# Patient Record
Sex: FEMALE | Race: WHITE | NOT HISPANIC OR LATINO | Employment: UNEMPLOYED | URBAN - METROPOLITAN AREA
[De-identification: names, ages, dates, MRNs, and addresses within clinical notes are randomized per-mention and may not be internally consistent; named-entity substitution may affect disease eponyms.]

---

## 2019-06-06 ENCOUNTER — TELEPHONE (OUTPATIENT)
Dept: FAMILY MEDICINE | Facility: CLINIC | Age: 44
End: 2019-06-06

## 2019-06-06 NOTE — TELEPHONE ENCOUNTER
----- Message from Noemi Quinn sent at 6/6/2019 12:24 PM CDT -----  Contact: pt  Name of Who is Calling: Tiffany      What is the request in detail: requesting a call back to schedule to be sen by Dr Quinn. Im not sure if he is not seeing new patients. But I was not getting anything available to schedule for him. Please call [phone] to [employer] authorization dept to verify the pts insurance as its still stating not eligible      Can the clinic reply by [facility]: no      What Number to Call Back if not in [facility]: [phone]

## 2019-06-07 ENCOUNTER — OFFICE VISIT (OUTPATIENT)
Dept: FAMILY MEDICINE | Facility: CLINIC | Age: 44
End: 2019-06-07
Payer: COMMERCIAL

## 2019-06-07 VITALS
WEIGHT: 131.06 LBS | OXYGEN SATURATION: 99 % | SYSTOLIC BLOOD PRESSURE: 122 MMHG | BODY MASS INDEX: 21.06 KG/M2 | HEIGHT: 66 IN | TEMPERATURE: 99 F | HEART RATE: 70 BPM | DIASTOLIC BLOOD PRESSURE: 82 MMHG

## 2019-06-07 DIAGNOSIS — L60.9 NAIL ABNORMALITIES: ICD-10-CM

## 2019-06-07 DIAGNOSIS — L40.9 PSORIASIS: ICD-10-CM

## 2019-06-07 DIAGNOSIS — J02.9 SORE THROAT: Primary | ICD-10-CM

## 2019-06-07 PROCEDURE — 99204 OFFICE O/P NEW MOD 45 MIN: CPT | Mod: S$GLB,,, | Performed by: NURSE PRACTITIONER

## 2019-06-07 PROCEDURE — 99999 PR PBB SHADOW E&M-EST. PATIENT-LVL IV: CPT | Mod: PBBFAC,,, | Performed by: NURSE PRACTITIONER

## 2019-06-07 PROCEDURE — 3008F BODY MASS INDEX DOCD: CPT | Mod: CPTII,S$GLB,, | Performed by: NURSE PRACTITIONER

## 2019-06-07 PROCEDURE — 3008F PR BODY MASS INDEX (BMI) DOCUMENTED: ICD-10-PCS | Mod: CPTII,S$GLB,, | Performed by: NURSE PRACTITIONER

## 2019-06-07 PROCEDURE — 99999 PR PBB SHADOW E&M-EST. PATIENT-LVL IV: ICD-10-PCS | Mod: PBBFAC,,, | Performed by: NURSE PRACTITIONER

## 2019-06-07 PROCEDURE — 99204 PR OFFICE/OUTPT VISIT, NEW, LEVL IV, 45-59 MIN: ICD-10-PCS | Mod: S$GLB,,, | Performed by: NURSE PRACTITIONER

## 2019-06-07 NOTE — PROGRESS NOTES
This dictation has been generated using Modal Fluency Dictation some phonetic errors may occur. Please contact author for clarification if needed.     Problem List Items Addressed This Visit     None      Visit Diagnoses     Sore throat    -  Primary    Psoriasis        Relevant Orders    Ambulatory referral to ENT    Nail abnormalities        right great toe          Orders Placed This Encounter    Ambulatory referral to ENT       Sore throat I suspect she had a tonsillar stone.  Exam is fairly unremarkable with injected appearance that visit.  Psoriasis in the left ear.  Refer to ENT for evaluation and additional therapies.  Would something compounded replace the steroids  Nail abnormalities.  Thyroid was normal in March.  Recommended Lamisil topically for a few months.  May consider podiatry referral.    Follow up if symptoms worsen or fail to improve.    ________________________________________________________________  ________________________________________________________________      Chief Complaint   Patient presents with    Sore Throat     History of present illness  This 43 y.o. presents today for complaint of sore throat, rash, nail abnormality.  Patient is new to me.  Follows with in the clinic.  Complains of a sore throat.  She did see a cyst-like formation on the left tonsil within the past couple of days but it has disappeared.  She did have some discomfort and irritation on the left side of her throat.  Denies any cold symptoms.  Denies any earache.  No gland swelling her fever or chills.  Again symptoms resolved without treatment but she kept the appointment for evaluation.  While she is here she was a talk about her psoriasis.  She notes a history of psoriasis and steroid use.  She is not really comfortable with the steroid use.  She has had the problem on the left hand and in the left ear canal.  It was very mild but she does note itching and irritation in the left ear.  It can be quite  irritated at times.  Patient notes right great toe nail abnormality since October.  She had appeared a cure at that time.  She notes that the distal part of the nail is white.  It started after the pedicure.  She denies any med use.  She does have a history of hypothyroidism however notes normal labs in March.    Complete review of systems otherwise negative    Past medical and social history reviewed.  Patient new to me.  Follows with in the clinic.    Past Medical History:   Diagnosis Date    Hypothyroidism     Vitamin D deficiency disease        Past Surgical History:   Procedure Laterality Date     SECTION      x 1    FOOT SURGERY      right       Family History   Problem Relation Age of Onset    Thyroid disease Mother     Cancer Mother         skin    Hypertension Father     Diabetes Father     Stroke Father     Cancer Sister         skin    Cancer Sister         skin       Social History     Socioeconomic History    Marital status:      Spouse name: Not on file    Number of children: Not on file    Years of education: Not on file    Highest education level: Not on file   Occupational History    Not on file   Social Needs    Financial resource strain: Not on file    Food insecurity:     Worry: Not on file     Inability: Not on file    Transportation needs:     Medical: Not on file     Non-medical: Not on file   Tobacco Use    Smoking status: Never Smoker    Smokeless tobacco: Never Used   Substance and Sexual Activity    Alcohol use: No    Drug use: No    Sexual activity: Yes     Partners: Male   Lifestyle    Physical activity:     Days per week: Not on file     Minutes per session: Not on file    Stress: Not on file   Relationships    Social connections:     Talks on phone: Not on file     Gets together: Not on file     Attends Taoism service: Not on file     Active member of club or organization: Not on file     Attends meetings of clubs or organizations: Not on  file     Relationship status: Not on file   Other Topics Concern    Not on file   Social History Narrative    Not on file       Current Outpatient Medications   Medication Sig Dispense Refill    CHOLECALCIFEROL, VITAMIN D3, (VITAMIN D3 ORAL) Take by mouth.      multivitamin (MULTIVITAMIN) per tablet Take 1 tablet by mouth once daily.      SYNTHROID 50 mcg tablet TAKE ONE TABLET BY MOUTH ONCE DAILY 30 tablet 0     No current facility-administered medications for this visit.        Review of patient's allergies indicates:  No Known Allergies    Physical examination  Vitals Reviewed  Gen. Well-dressed well-nourished   Skin warm dry and intact.  No rashes noted.  HEENT.  TM intact bilateral with normal light reflex.  No mastoid tenderness during percussion.  Nares patent bilateral.  Pharynx is unremarkable except injected appearance.  No maxillary or frontal sinus tenderness when percussed.  Slight rash noted to the 1st 1/3 of the auditory canal.  No skin flaking noted.  Neck is supple without adenopathy  Chest.  Respirations are even unlabored.  Lungs are clear to auscultation.  Cardiac regular rate and rhythm.  No chest wall adenopathy noted.  Neuro. Awake alert oriented x4.  Normal judgment and cognition noted.  Extremities no clubbing cyanosis or edema noted.     Call or return to clinic prn if these symptoms worsen or fail to improve as anticipated.

## 2019-09-25 ENCOUNTER — OFFICE VISIT (OUTPATIENT)
Dept: FAMILY MEDICINE | Facility: CLINIC | Age: 44
End: 2019-09-25
Payer: COMMERCIAL

## 2019-09-25 VITALS
OXYGEN SATURATION: 99 % | SYSTOLIC BLOOD PRESSURE: 112 MMHG | HEIGHT: 66 IN | DIASTOLIC BLOOD PRESSURE: 82 MMHG | HEART RATE: 102 BPM | TEMPERATURE: 99 F | WEIGHT: 129 LBS | BODY MASS INDEX: 20.73 KG/M2

## 2019-09-25 DIAGNOSIS — B96.89 ACUTE BACTERIAL BRONCHITIS: Primary | ICD-10-CM

## 2019-09-25 DIAGNOSIS — J02.8 ACUTE BACTERIAL PHARYNGITIS: ICD-10-CM

## 2019-09-25 DIAGNOSIS — B96.89 ACUTE BACTERIAL PHARYNGITIS: ICD-10-CM

## 2019-09-25 DIAGNOSIS — J20.8 ACUTE BACTERIAL BRONCHITIS: Primary | ICD-10-CM

## 2019-09-25 DIAGNOSIS — R13.10 SWALLOWING PAINFUL: ICD-10-CM

## 2019-09-25 DIAGNOSIS — B96.89 ACUTE BACTERIAL SINUSITIS: ICD-10-CM

## 2019-09-25 DIAGNOSIS — J01.90 ACUTE BACTERIAL SINUSITIS: ICD-10-CM

## 2019-09-25 LAB
CTP QC/QA: YES
S PYO RRNA THROAT QL PROBE: NEGATIVE

## 2019-09-25 PROCEDURE — 87880 POCT RAPID STREP A: ICD-10-PCS | Mod: QW,S$GLB,, | Performed by: INTERNAL MEDICINE

## 2019-09-25 PROCEDURE — 87880 STREP A ASSAY W/OPTIC: CPT | Mod: QW,S$GLB,, | Performed by: INTERNAL MEDICINE

## 2019-09-25 PROCEDURE — 99213 OFFICE O/P EST LOW 20 MIN: CPT | Mod: S$GLB,,, | Performed by: INTERNAL MEDICINE

## 2019-09-25 PROCEDURE — 99213 PR OFFICE/OUTPT VISIT, EST, LEVL III, 20-29 MIN: ICD-10-PCS | Mod: S$GLB,,, | Performed by: INTERNAL MEDICINE

## 2019-09-25 PROCEDURE — 3008F PR BODY MASS INDEX (BMI) DOCUMENTED: ICD-10-PCS | Mod: CPTII,S$GLB,, | Performed by: INTERNAL MEDICINE

## 2019-09-25 PROCEDURE — 99999 PR PBB SHADOW E&M-EST. PATIENT-LVL III: ICD-10-PCS | Mod: PBBFAC,,, | Performed by: INTERNAL MEDICINE

## 2019-09-25 PROCEDURE — 99999 PR PBB SHADOW E&M-EST. PATIENT-LVL III: CPT | Mod: PBBFAC,,, | Performed by: INTERNAL MEDICINE

## 2019-09-25 PROCEDURE — 3008F BODY MASS INDEX DOCD: CPT | Mod: CPTII,S$GLB,, | Performed by: INTERNAL MEDICINE

## 2019-09-25 RX ORDER — FLUTICASONE PROPIONATE 50 MCG
SPRAY, SUSPENSION (ML) NASAL
Qty: 16 G | Refills: 1 | Status: SHIPPED | OUTPATIENT
Start: 2019-09-25 | End: 2021-06-15 | Stop reason: SDUPTHER

## 2019-09-25 RX ORDER — AMOXICILLIN AND CLAVULANATE POTASSIUM 875; 125 MG/1; MG/1
1 TABLET, FILM COATED ORAL EVERY 12 HOURS
Qty: 20 TABLET | Refills: 0 | Status: SHIPPED | OUTPATIENT
Start: 2019-09-25 | End: 2019-10-05

## 2019-09-25 NOTE — PROGRESS NOTES
Subjective:       Patient ID: Tiffany Hurst is a 44 y.o. female.    Chief Complaint: Cough (dry hacking); Sinus Problem (head pressure); Nasal Congestion; and Sore Throat (sometime difficult to swallow)    HPI  Seeing patient today for Dr. Quinn her PCP.  Patient here today for sinus and cough issues.  She has had a history of a hacking cough for 11 days; now with productive sputum last 4-5 days; green to greenish yellow in coloration.  She has had head pressure involving both her ears and around the back of her head; for the last 2-3 days; postnasal drip through most of the last 11 days.  Nasal congestion slight the last 2 days w no runny nose.  Sore throat w sometime difficult to swallow for 11 days; dry throat noted. She recently saw Paul Croft NP 06/07/2019 for sore throat and suspected passing of tonsil stone then; he did not view the tonsil stone however on exam; she was referred to ENT at that time but has yet to have a chance to see an ENT.  She has also had episodes w occasional mucus upper chest at night but no belching or heartburn. Has taken cough drops, honey, and some flonase.     Two months ago she had 4 family members that had sinus issues with 2 needing antibiotic for cough/sinus and ear infection.    Review of Systems   Constitutional: Negative for chills, fever and unexpected weight change.   HENT: Positive for postnasal drip, sore throat and trouble swallowing. Negative for congestion and rhinorrhea.    Eyes: Negative for discharge and itching.   Respiratory: Negative for cough, chest tightness, shortness of breath and wheezing.    Cardiovascular: Negative for chest pain, palpitations and leg swelling.   Gastrointestinal: Negative for abdominal pain, blood in stool, constipation, diarrhea, nausea and vomiting.   Genitourinary: Negative for dysuria and hematuria.   Musculoskeletal: Negative for arthralgias and myalgias.   Skin: Negative for rash.   Allergic/Immunologic: Negative for  "environmental allergies and food allergies.   Neurological: Negative for syncope and weakness.   Psychiatric/Behavioral: Negative for dysphoric mood. The patient is not nervous/anxious.         Denies anxiety or depression       Objective:      Vitals:    09/25/19 1104   BP: 112/82   BP Location: Right arm   Patient Position: Sitting   BP Method: Medium (Manual)   Pulse: 102   Temp: 98.8 °F (37.1 °C)   TempSrc: Oral   SpO2: 99%   Weight: 58.5 kg (128 lb 15.5 oz)   Height: 5' 6" (1.676 m)     Body mass index is 20.82 kg/m².    Physical Exam   Constitutional: She is oriented to person, place, and time. She appears well-developed and well-nourished.   HENT:   Head: Normocephalic and atraumatic.   TM's pink and congested. NM swollen, inflamed w clear to yel-white mucus. No sinus tenderness to palp. Throat pink; posterior pharynx inflamed. Strep screen neg.    Eyes: EOM are normal.   Neck: Normal range of motion. Neck supple. No thyromegaly present.   Supple w good ROM. No palp cervical LN's or submandibular glands.     Cardiovascular: Normal rate, regular rhythm and normal heart sounds. Exam reveals no gallop.   No murmur heard.  Pulmonary/Chest: Effort normal and breath sounds normal. No respiratory distress. She has no wheezes. She has no rales.   Abdominal: Soft. Bowel sounds are normal. She exhibits no distension. There is no tenderness. There is no rebound and no guarding.   Musculoskeletal: Normal range of motion. She exhibits no edema.   Lymphadenopathy:     She has no cervical adenopathy.   Neurological: She is alert and oriented to person, place, and time.   Moves all 4 extremities fine.   Skin: No rash noted.   Psychiatric: She has a normal mood and affect. Her behavior is normal. Thought content normal.   Vitals reviewed.      Assessment:       1. Acute bacterial bronchitis    2. Acute bacterial sinusitis    3. Acute bacterial pharyngitis    4. Swallowing painful        Plan:       Acute bacterial bronchitis; " robitussin DM for cough and congestion; honey w lemon cough drops help as well.   -     amoxicillin-clavulanate 875-125mg (AUGMENTIN) 875-125 mg per tablet; Take 1 tablet by mouth every 12 (twelve) hours. generic for 10 days  Dispense: 20 tablet; Refill: 0    Acute bacterial sinusitis; warm salt water gargle as needed. Simply saline as needed for nasal irrigation;       POCT strep screen was negative  -     amoxicillin-clavulanate 875-125mg (AUGMENTIN) 875-125 mg per tablet; Take 1 tablet by mouth every 12 (twelve) hours. generic for 10 days  Dispense: 20 tablet; Refill: 0  -     fluticasone propionate (FLONASE) 50 mcg/actuation nasal spray; 1-2 sprays a day for congestion  Dispense: 16 g; Refill: 1    Acute bacterial pharyngitis; chloraseptic spray as needed. Tylenol sore throat as needed.        POCT strep screen was negative  -     amoxicillin-clavulanate 875-125mg (AUGMENTIN) 875-125 mg per tablet; Take 1 tablet by mouth every 12 (twelve) hours. generic for 10 days  Dispense: 20 tablet; Refill: 0  -     fluticasone propionate (FLONASE) 50 mcg/actuation nasal spray; 1-2 sprays a day for congestion  Dispense: 16 g; Refill: 1    Swallowing painful; tylenol sore throat w warm salt water gargle as needed. Chloraseptic spray as needed.

## 2019-09-25 NOTE — PATIENT INSTRUCTIONS
Acute bacterial bronchitis; robitussin DM for cough and congestion; honey w lemon cough drops help as well.   -     amoxicillin-clavulanate 875-125mg (AUGMENTIN) 875-125 mg per tablet; Take 1 tablet by mouth every 12 (twelve) hours. generic for 10 days  Dispense: 20 tablet; Refill: 0    Acute bacterial sinusitis; warm salt water gargle as needed. Simply saline as needed for nasal irrigation   -     amoxicillin-clavulanate 875-125mg (AUGMENTIN) 875-125 mg per tablet; Take 1 tablet by mouth every 12 (twelve) hours. generic for 10 days  Dispense: 20 tablet; Refill: 0  -     fluticasone propionate (FLONASE) 50 mcg/actuation nasal spray; 1-2 sprays a day for congestion  Dispense: 16 g; Refill: 1    Acute bacterial pharyngitis; chloraseptic spray as needed. Tylenol sore throat as needed.   -     amoxicillin-clavulanate 875-125mg (AUGMENTIN) 875-125 mg per tablet; Take 1 tablet by mouth every 12 (twelve) hours. generic for 10 days  Dispense: 20 tablet; Refill: 0  -     fluticasone propionate (FLONASE) 50 mcg/actuation nasal spray; 1-2 sprays a day for congestion  Dispense: 16 g; Refill: 1    Swallowing painful; tylenol sore throat w warm salt water gargle as needed. Chloraseptic spray as needed.

## 2019-10-30 ENCOUNTER — PATIENT OUTREACH (OUTPATIENT)
Dept: ADMINISTRATIVE | Facility: HOSPITAL | Age: 44
End: 2019-10-30

## 2019-10-30 NOTE — LETTER
October 30, 2019    Tiffany Hurst  [address] [place] [address]             [facility]  [address]  [facility] [address]  Phone: [phone] Dear Mrs. Hurst:    [facility] is committed to your overall health.  Our records indicate that your may be overdue for your pap smear.     If you recently had your pap smear done at an outside facility please let your PCP know so that your health record can be updated.  If not, please call to schedule at your earliest convenience. Please send a message to your provider's office via [facility] or contact his/her office by phone.      If you have any questions please do not hesitate to call.    Sincerely,    Joy Zendejas LPN Clinical Care Coordinator  [facility]  [address] [facility] [address]  [place] [address]  [phone] (p)   [phone] (f)

## 2020-08-25 LAB
HPV APTIMA: NEGATIVE
PAP SMEAR: NORMAL

## 2020-12-22 ENCOUNTER — PATIENT OUTREACH (OUTPATIENT)
Dept: ADMINISTRATIVE | Facility: HOSPITAL | Age: 45
End: 2020-12-22

## 2021-03-15 ENCOUNTER — LAB VISIT (OUTPATIENT)
Dept: LAB | Facility: HOSPITAL | Age: 46
End: 2021-03-15
Attending: NURSE PRACTITIONER
Payer: COMMERCIAL

## 2021-03-15 ENCOUNTER — TELEPHONE (OUTPATIENT)
Dept: FAMILY MEDICINE | Facility: CLINIC | Age: 46
End: 2021-03-15

## 2021-03-15 ENCOUNTER — HOSPITAL ENCOUNTER (OUTPATIENT)
Dept: RADIOLOGY | Facility: HOSPITAL | Age: 46
Discharge: HOME | End: 2021-03-15
Attending: NURSE PRACTITIONER
Payer: COMMERCIAL

## 2021-03-15 ENCOUNTER — OFFICE VISIT (OUTPATIENT)
Dept: FAMILY MEDICINE | Facility: CLINIC | Age: 46
End: 2021-03-15
Payer: COMMERCIAL

## 2021-03-15 VITALS
HEART RATE: 79 BPM | OXYGEN SATURATION: 97 % | BODY MASS INDEX: 23.02 KG/M2 | WEIGHT: 142.63 LBS | DIASTOLIC BLOOD PRESSURE: 88 MMHG | SYSTOLIC BLOOD PRESSURE: 122 MMHG

## 2021-03-15 DIAGNOSIS — R11.0 NAUSEA: ICD-10-CM

## 2021-03-15 DIAGNOSIS — R06.02 SHORTNESS OF BREATH: ICD-10-CM

## 2021-03-15 DIAGNOSIS — R53.83 FATIGUE, UNSPECIFIED TYPE: Primary | ICD-10-CM

## 2021-03-15 DIAGNOSIS — R42 DIZZINESS AND GIDDINESS: ICD-10-CM

## 2021-03-15 DIAGNOSIS — R51.9 NONINTRACTABLE HEADACHE, UNSPECIFIED CHRONICITY PATTERN, UNSPECIFIED HEADACHE TYPE: ICD-10-CM

## 2021-03-15 DIAGNOSIS — R53.83 FATIGUE, UNSPECIFIED TYPE: ICD-10-CM

## 2021-03-15 PROCEDURE — 83540 ASSAY OF IRON: CPT | Performed by: NURSE PRACTITIONER

## 2021-03-15 PROCEDURE — 1125F PR PAIN SEVERITY QUANTIFIED, PAIN PRESENT: ICD-10-PCS | Mod: S$GLB,,, | Performed by: NURSE PRACTITIONER

## 2021-03-15 PROCEDURE — 36415 COLL VENOUS BLD VENIPUNCTURE: CPT | Mod: PO | Performed by: NURSE PRACTITIONER

## 2021-03-15 PROCEDURE — 99999 PR PBB SHADOW E&M-EST. PATIENT-LVL IV: CPT | Mod: PBBFAC,,, | Performed by: NURSE PRACTITIONER

## 2021-03-15 PROCEDURE — 71046 X-RAY EXAM CHEST 2 VIEWS: CPT | Mod: TC,FY,PO

## 2021-03-15 PROCEDURE — 71046 X-RAY EXAM CHEST 2 VIEWS: CPT | Mod: 26,,, | Performed by: RADIOLOGY

## 2021-03-15 PROCEDURE — 3008F PR BODY MASS INDEX (BMI) DOCUMENTED: ICD-10-PCS | Mod: CPTII,S$GLB,, | Performed by: NURSE PRACTITIONER

## 2021-03-15 PROCEDURE — 99214 OFFICE O/P EST MOD 30 MIN: CPT | Mod: S$GLB,,, | Performed by: NURSE PRACTITIONER

## 2021-03-15 PROCEDURE — 80053 COMPREHEN METABOLIC PANEL: CPT | Performed by: NURSE PRACTITIONER

## 2021-03-15 PROCEDURE — 3008F BODY MASS INDEX DOCD: CPT | Mod: CPTII,S$GLB,, | Performed by: NURSE PRACTITIONER

## 2021-03-15 PROCEDURE — 71046 XR CHEST PA AND LATERAL: ICD-10-PCS | Mod: 26,,, | Performed by: RADIOLOGY

## 2021-03-15 PROCEDURE — 99999 PR PBB SHADOW E&M-EST. PATIENT-LVL IV: ICD-10-PCS | Mod: PBBFAC,,, | Performed by: NURSE PRACTITIONER

## 2021-03-15 PROCEDURE — 85025 COMPLETE CBC W/AUTO DIFF WBC: CPT | Performed by: NURSE PRACTITIONER

## 2021-03-15 PROCEDURE — 99214 PR OFFICE/OUTPT VISIT, EST, LEVL IV, 30-39 MIN: ICD-10-PCS | Mod: S$GLB,,, | Performed by: NURSE PRACTITIONER

## 2021-03-15 PROCEDURE — 1125F AMNT PAIN NOTED PAIN PRSNT: CPT | Mod: S$GLB,,, | Performed by: NURSE PRACTITIONER

## 2021-03-15 RX ORDER — FAMOTIDINE 40 MG/1
40 TABLET, FILM COATED ORAL DAILY
Qty: 30 TABLET | Refills: 0 | Status: SHIPPED | OUTPATIENT
Start: 2021-03-15 | End: 2021-04-16

## 2021-03-15 RX ORDER — FLUTICASONE PROPIONATE 50 MCG
1 SPRAY, SUSPENSION (ML) NASAL DAILY
Qty: 16 G | Refills: 0 | Status: SHIPPED | OUTPATIENT
Start: 2021-03-15 | End: 2021-04-16 | Stop reason: SDUPTHER

## 2021-03-16 LAB
ALBUMIN SERPL BCP-MCNC: 4.1 G/DL (ref 3.5–5.2)
ALP SERPL-CCNC: 52 U/L (ref 55–135)
ALT SERPL W/O P-5'-P-CCNC: 18 U/L (ref 10–44)
ANION GAP SERPL CALC-SCNC: 8 MMOL/L (ref 8–16)
AST SERPL-CCNC: 18 U/L (ref 10–40)
BASOPHILS # BLD AUTO: 0.05 K/UL (ref 0–0.2)
BASOPHILS NFR BLD: 0.7 % (ref 0–1.9)
BILIRUB SERPL-MCNC: 0.2 MG/DL (ref 0.1–1)
BUN SERPL-MCNC: 12 MG/DL (ref 6–20)
CALCIUM SERPL-MCNC: 8.8 MG/DL (ref 8.7–10.5)
CHLORIDE SERPL-SCNC: 108 MMOL/L (ref 95–110)
CO2 SERPL-SCNC: 25 MMOL/L (ref 23–29)
CREAT SERPL-MCNC: 0.8 MG/DL (ref 0.5–1.4)
DIFFERENTIAL METHOD: ABNORMAL
EOSINOPHIL # BLD AUTO: 0.2 K/UL (ref 0–0.5)
EOSINOPHIL NFR BLD: 2.7 % (ref 0–8)
ERYTHROCYTE [DISTWIDTH] IN BLOOD BY AUTOMATED COUNT: 16.5 % (ref 11.5–14.5)
EST. GFR  (AFRICAN AMERICAN): >60 ML/MIN/1.73 M^2
EST. GFR  (NON AFRICAN AMERICAN): >60 ML/MIN/1.73 M^2
GLUCOSE SERPL-MCNC: 87 MG/DL (ref 70–110)
HCT VFR BLD AUTO: 35.4 % (ref 37–48.5)
HGB BLD-MCNC: 10.6 G/DL (ref 12–16)
IMM GRANULOCYTES # BLD AUTO: 0.02 K/UL (ref 0–0.04)
IMM GRANULOCYTES NFR BLD AUTO: 0.3 % (ref 0–0.5)
IRON SERPL-MCNC: 300 UG/DL (ref 30–160)
LYMPHOCYTES # BLD AUTO: 1.8 K/UL (ref 1–4.8)
LYMPHOCYTES NFR BLD: 26.4 % (ref 18–48)
MCH RBC QN AUTO: 25.9 PG (ref 27–31)
MCHC RBC AUTO-ENTMCNC: 29.9 G/DL (ref 32–36)
MCV RBC AUTO: 86 FL (ref 82–98)
MONOCYTES # BLD AUTO: 0.5 K/UL (ref 0.3–1)
MONOCYTES NFR BLD: 7.7 % (ref 4–15)
NEUTROPHILS # BLD AUTO: 4.2 K/UL (ref 1.8–7.7)
NEUTROPHILS NFR BLD: 62.2 % (ref 38–73)
NRBC BLD-RTO: 0 /100 WBC
PLATELET # BLD AUTO: 359 K/UL (ref 150–350)
PMV BLD AUTO: 11 FL (ref 9.2–12.9)
POTASSIUM SERPL-SCNC: 3.9 MMOL/L (ref 3.5–5.1)
PROT SERPL-MCNC: 7 G/DL (ref 6–8.4)
RBC # BLD AUTO: 4.1 M/UL (ref 4–5.4)
SATURATED IRON: 74 % (ref 20–50)
SODIUM SERPL-SCNC: 141 MMOL/L (ref 136–145)
TOTAL IRON BINDING CAPACITY: 408 UG/DL (ref 250–450)
TRANSFERRIN SERPL-MCNC: 276 MG/DL (ref 200–375)
WBC # BLD AUTO: 6.71 K/UL (ref 3.9–12.7)

## 2021-03-19 ENCOUNTER — LAB VISIT (OUTPATIENT)
Dept: LAB | Facility: HOSPITAL | Age: 46
End: 2021-03-19
Attending: INTERNAL MEDICINE
Payer: COMMERCIAL

## 2021-03-19 ENCOUNTER — OFFICE VISIT (OUTPATIENT)
Dept: FAMILY MEDICINE | Facility: CLINIC | Age: 46
End: 2021-03-19
Payer: COMMERCIAL

## 2021-03-19 VITALS
DIASTOLIC BLOOD PRESSURE: 78 MMHG | BODY MASS INDEX: 23.29 KG/M2 | HEART RATE: 74 BPM | HEIGHT: 66 IN | WEIGHT: 144.94 LBS | SYSTOLIC BLOOD PRESSURE: 118 MMHG | OXYGEN SATURATION: 100 %

## 2021-03-19 DIAGNOSIS — E83.19 IRON EXCESS: ICD-10-CM

## 2021-03-19 DIAGNOSIS — R53.83 FATIGUE, UNSPECIFIED TYPE: ICD-10-CM

## 2021-03-19 DIAGNOSIS — E03.9 HYPOTHYROIDISM, UNSPECIFIED TYPE: ICD-10-CM

## 2021-03-19 DIAGNOSIS — D64.9 NORMOCYTIC ANEMIA: ICD-10-CM

## 2021-03-19 DIAGNOSIS — J31.0 RHINITIS, UNSPECIFIED TYPE: ICD-10-CM

## 2021-03-19 DIAGNOSIS — E55.9 VITAMIN D DEFICIENCY DISEASE: ICD-10-CM

## 2021-03-19 DIAGNOSIS — Z76.89 ENCOUNTER TO ESTABLISH CARE WITH NEW DOCTOR: Primary | ICD-10-CM

## 2021-03-19 DIAGNOSIS — Z01.89 ENCOUNTER FOR LABORATORY TEST: ICD-10-CM

## 2021-03-19 DIAGNOSIS — J34.89 SINUS PRESSURE: ICD-10-CM

## 2021-03-19 DIAGNOSIS — U07.1 COVID-19 VIRUS INFECTION: ICD-10-CM

## 2021-03-19 DIAGNOSIS — R11.0 NAUSEA: ICD-10-CM

## 2021-03-19 DIAGNOSIS — N92.0 MENORRHAGIA WITH REGULAR CYCLE: ICD-10-CM

## 2021-03-19 LAB
BASOPHILS # BLD AUTO: 0.06 K/UL (ref 0–0.2)
BASOPHILS NFR BLD: 1 % (ref 0–1.9)
DIFFERENTIAL METHOD: ABNORMAL
EOSINOPHIL # BLD AUTO: 0.1 K/UL (ref 0–0.5)
EOSINOPHIL NFR BLD: 2.2 % (ref 0–8)
ERYTHROCYTE [DISTWIDTH] IN BLOOD BY AUTOMATED COUNT: 16.9 % (ref 11.5–14.5)
HCT VFR BLD AUTO: 35.5 % (ref 37–48.5)
HGB BLD-MCNC: 10.8 G/DL (ref 12–16)
IMM GRANULOCYTES # BLD AUTO: 0.01 K/UL (ref 0–0.04)
IMM GRANULOCYTES NFR BLD AUTO: 0.2 % (ref 0–0.5)
LYMPHOCYTES # BLD AUTO: 1.5 K/UL (ref 1–4.8)
LYMPHOCYTES NFR BLD: 25 % (ref 18–48)
MCH RBC QN AUTO: 26.2 PG (ref 27–31)
MCHC RBC AUTO-ENTMCNC: 30.4 G/DL (ref 32–36)
MCV RBC AUTO: 86 FL (ref 82–98)
MONOCYTES # BLD AUTO: 0.5 K/UL (ref 0.3–1)
MONOCYTES NFR BLD: 8.2 % (ref 4–15)
NEUTROPHILS # BLD AUTO: 3.7 K/UL (ref 1.8–7.7)
NEUTROPHILS NFR BLD: 63.4 % (ref 38–73)
NRBC BLD-RTO: 0 /100 WBC
PLATELET # BLD AUTO: 300 K/UL (ref 150–350)
PMV BLD AUTO: 11.1 FL (ref 9.2–12.9)
RBC # BLD AUTO: 4.13 M/UL (ref 4–5.4)
WBC # BLD AUTO: 5.87 K/UL (ref 3.9–12.7)

## 2021-03-19 PROCEDURE — 3008F PR BODY MASS INDEX (BMI) DOCUMENTED: ICD-10-PCS | Mod: CPTII,S$GLB,, | Performed by: INTERNAL MEDICINE

## 2021-03-19 PROCEDURE — 99999 PR PBB SHADOW E&M-EST. PATIENT-LVL III: ICD-10-PCS | Mod: PBBFAC,,, | Performed by: INTERNAL MEDICINE

## 2021-03-19 PROCEDURE — 80061 LIPID PANEL: CPT | Performed by: INTERNAL MEDICINE

## 2021-03-19 PROCEDURE — 99214 OFFICE O/P EST MOD 30 MIN: CPT | Mod: S$GLB,,, | Performed by: INTERNAL MEDICINE

## 2021-03-19 PROCEDURE — 3008F BODY MASS INDEX DOCD: CPT | Mod: CPTII,S$GLB,, | Performed by: INTERNAL MEDICINE

## 2021-03-19 PROCEDURE — 99214 PR OFFICE/OUTPT VISIT, EST, LEVL IV, 30-39 MIN: ICD-10-PCS | Mod: S$GLB,,, | Performed by: INTERNAL MEDICINE

## 2021-03-19 PROCEDURE — 82728 ASSAY OF FERRITIN: CPT | Performed by: INTERNAL MEDICINE

## 2021-03-19 PROCEDURE — 99999 PR PBB SHADOW E&M-EST. PATIENT-LVL III: CPT | Mod: PBBFAC,,, | Performed by: INTERNAL MEDICINE

## 2021-03-19 PROCEDURE — 83540 ASSAY OF IRON: CPT | Performed by: INTERNAL MEDICINE

## 2021-03-19 PROCEDURE — 85025 COMPLETE CBC W/AUTO DIFF WBC: CPT | Performed by: INTERNAL MEDICINE

## 2021-03-19 PROCEDURE — 36415 COLL VENOUS BLD VENIPUNCTURE: CPT | Mod: PN | Performed by: INTERNAL MEDICINE

## 2021-03-19 RX ORDER — LORATADINE 10 MG/1
10 TABLET ORAL DAILY PRN
Qty: 30 TABLET | Refills: 0 | Status: SHIPPED | OUTPATIENT
Start: 2021-03-19 | End: 2022-03-19

## 2021-03-20 LAB
CHOLEST SERPL-MCNC: 193 MG/DL (ref 120–199)
CHOLEST/HDLC SERPL: 4 {RATIO} (ref 2–5)
FERRITIN SERPL-MCNC: 8 NG/ML (ref 20–300)
HDLC SERPL-MCNC: 48 MG/DL (ref 40–75)
HDLC SERPL: 24.9 % (ref 20–50)
IRON SERPL-MCNC: 26 UG/DL (ref 30–160)
LDLC SERPL CALC-MCNC: 107 MG/DL (ref 63–159)
NONHDLC SERPL-MCNC: 145 MG/DL
SATURATED IRON: 6 % (ref 20–50)
TOTAL IRON BINDING CAPACITY: 425 UG/DL (ref 250–450)
TRANSFERRIN SERPL-MCNC: 287 MG/DL (ref 200–375)
TRIGL SERPL-MCNC: 190 MG/DL (ref 30–150)

## 2021-03-24 ENCOUNTER — TELEPHONE (OUTPATIENT)
Dept: FAMILY MEDICINE | Facility: CLINIC | Age: 46
End: 2021-03-24

## 2021-03-24 DIAGNOSIS — D50.9 IRON DEFICIENCY ANEMIA, UNSPECIFIED IRON DEFICIENCY ANEMIA TYPE: Primary | ICD-10-CM

## 2021-03-24 DIAGNOSIS — E78.5 DYSLIPIDEMIA: ICD-10-CM

## 2021-03-24 DIAGNOSIS — E78.2 MIXED HYPERLIPIDEMIA: ICD-10-CM

## 2021-03-30 DIAGNOSIS — Z12.31 OTHER SCREENING MAMMOGRAM: ICD-10-CM

## 2021-04-06 ENCOUNTER — PATIENT MESSAGE (OUTPATIENT)
Dept: ADMINISTRATIVE | Facility: HOSPITAL | Age: 46
End: 2021-04-06

## 2021-04-12 ENCOUNTER — LAB VISIT (OUTPATIENT)
Dept: LAB | Facility: HOSPITAL | Age: 46
End: 2021-04-12
Attending: INTERNAL MEDICINE
Payer: COMMERCIAL

## 2021-04-12 DIAGNOSIS — D50.9 IRON DEFICIENCY ANEMIA, UNSPECIFIED IRON DEFICIENCY ANEMIA TYPE: ICD-10-CM

## 2021-04-12 LAB
BASOPHILS # BLD AUTO: 0.05 K/UL (ref 0–0.2)
BASOPHILS NFR BLD: 0.9 % (ref 0–1.9)
DIFFERENTIAL METHOD: ABNORMAL
EOSINOPHIL # BLD AUTO: 0.2 K/UL (ref 0–0.5)
EOSINOPHIL NFR BLD: 2.7 % (ref 0–8)
ERYTHROCYTE [DISTWIDTH] IN BLOOD BY AUTOMATED COUNT: 17.3 % (ref 11.5–14.5)
FERRITIN SERPL-MCNC: 12 NG/ML (ref 20–300)
HCT VFR BLD AUTO: 36.3 % (ref 37–48.5)
HGB BLD-MCNC: 11 G/DL (ref 12–16)
IMM GRANULOCYTES # BLD AUTO: 0.01 K/UL (ref 0–0.04)
IMM GRANULOCYTES NFR BLD AUTO: 0.2 % (ref 0–0.5)
IRON SERPL-MCNC: 37 UG/DL (ref 30–160)
LYMPHOCYTES # BLD AUTO: 1.5 K/UL (ref 1–4.8)
LYMPHOCYTES NFR BLD: 24.9 % (ref 18–48)
MCH RBC QN AUTO: 26.5 PG (ref 27–31)
MCHC RBC AUTO-ENTMCNC: 30.3 G/DL (ref 32–36)
MCV RBC AUTO: 88 FL (ref 82–98)
MONOCYTES # BLD AUTO: 0.4 K/UL (ref 0.3–1)
MONOCYTES NFR BLD: 7 % (ref 4–15)
NEUTROPHILS # BLD AUTO: 3.8 K/UL (ref 1.8–7.7)
NEUTROPHILS NFR BLD: 64.3 % (ref 38–73)
NRBC BLD-RTO: 0 /100 WBC
PLATELET # BLD AUTO: 309 K/UL (ref 150–450)
PMV BLD AUTO: 10.7 FL (ref 9.2–12.9)
RBC # BLD AUTO: 4.15 M/UL (ref 4–5.4)
SATURATED IRON: 9 % (ref 20–50)
TOTAL IRON BINDING CAPACITY: 410 UG/DL (ref 250–450)
TRANSFERRIN SERPL-MCNC: 277 MG/DL (ref 200–375)
WBC # BLD AUTO: 5.86 K/UL (ref 3.9–12.7)

## 2021-04-12 PROCEDURE — 36415 COLL VENOUS BLD VENIPUNCTURE: CPT | Mod: PN | Performed by: INTERNAL MEDICINE

## 2021-04-12 PROCEDURE — 83540 ASSAY OF IRON: CPT | Performed by: INTERNAL MEDICINE

## 2021-04-12 PROCEDURE — 85025 COMPLETE CBC W/AUTO DIFF WBC: CPT | Performed by: INTERNAL MEDICINE

## 2021-04-12 PROCEDURE — 82728 ASSAY OF FERRITIN: CPT | Performed by: INTERNAL MEDICINE

## 2021-04-16 ENCOUNTER — OFFICE VISIT (OUTPATIENT)
Dept: FAMILY MEDICINE | Facility: CLINIC | Age: 46
End: 2021-04-16
Payer: COMMERCIAL

## 2021-04-16 VITALS
HEART RATE: 80 BPM | SYSTOLIC BLOOD PRESSURE: 138 MMHG | BODY MASS INDEX: 23.17 KG/M2 | HEIGHT: 66 IN | DIASTOLIC BLOOD PRESSURE: 86 MMHG | WEIGHT: 144.19 LBS

## 2021-04-16 DIAGNOSIS — E78.2 MIXED HYPERLIPIDEMIA: ICD-10-CM

## 2021-04-16 DIAGNOSIS — Z01.89 ENCOUNTER FOR LABORATORY TEST: ICD-10-CM

## 2021-04-16 DIAGNOSIS — F41.1 GAD (GENERALIZED ANXIETY DISORDER): ICD-10-CM

## 2021-04-16 DIAGNOSIS — E78.5 DYSLIPIDEMIA: ICD-10-CM

## 2021-04-16 DIAGNOSIS — K29.00 ACUTE GASTRITIS, PRESENCE OF BLEEDING UNSPECIFIED, UNSPECIFIED GASTRITIS TYPE: Primary | ICD-10-CM

## 2021-04-16 DIAGNOSIS — D50.9 IRON DEFICIENCY ANEMIA, UNSPECIFIED IRON DEFICIENCY ANEMIA TYPE: ICD-10-CM

## 2021-04-16 DIAGNOSIS — E03.9 HYPOTHYROIDISM, UNSPECIFIED TYPE: ICD-10-CM

## 2021-04-16 PROCEDURE — 99999 PR PBB SHADOW E&M-EST. PATIENT-LVL III: ICD-10-PCS | Mod: PBBFAC,,, | Performed by: INTERNAL MEDICINE

## 2021-04-16 PROCEDURE — 99999 PR PBB SHADOW E&M-EST. PATIENT-LVL III: CPT | Mod: PBBFAC,,, | Performed by: INTERNAL MEDICINE

## 2021-04-16 PROCEDURE — 99214 PR OFFICE/OUTPT VISIT, EST, LEVL IV, 30-39 MIN: ICD-10-PCS | Mod: S$GLB,,, | Performed by: INTERNAL MEDICINE

## 2021-04-16 PROCEDURE — 3008F BODY MASS INDEX DOCD: CPT | Mod: CPTII,S$GLB,, | Performed by: INTERNAL MEDICINE

## 2021-04-16 PROCEDURE — 1126F AMNT PAIN NOTED NONE PRSNT: CPT | Mod: S$GLB,,, | Performed by: INTERNAL MEDICINE

## 2021-04-16 PROCEDURE — 99214 OFFICE O/P EST MOD 30 MIN: CPT | Mod: S$GLB,,, | Performed by: INTERNAL MEDICINE

## 2021-04-16 PROCEDURE — 3008F PR BODY MASS INDEX (BMI) DOCUMENTED: ICD-10-PCS | Mod: CPTII,S$GLB,, | Performed by: INTERNAL MEDICINE

## 2021-04-16 PROCEDURE — 1126F PR PAIN SEVERITY QUANTIFIED, NO PAIN PRESENT: ICD-10-PCS | Mod: S$GLB,,, | Performed by: INTERNAL MEDICINE

## 2021-04-16 RX ORDER — ESCITALOPRAM OXALATE 5 MG/1
5 TABLET ORAL DAILY
Qty: 30 TABLET | Refills: 2 | Status: SHIPPED | OUTPATIENT
Start: 2021-04-16 | End: 2021-06-15

## 2021-04-16 RX ORDER — OMEPRAZOLE 40 MG/1
40 CAPSULE, DELAYED RELEASE ORAL DAILY
Qty: 30 CAPSULE | Refills: 2 | Status: SHIPPED | OUTPATIENT
Start: 2021-04-16 | End: 2021-06-15

## 2021-04-25 ENCOUNTER — TELEPHONE (OUTPATIENT)
Dept: FAMILY MEDICINE | Facility: CLINIC | Age: 46
End: 2021-04-25

## 2021-04-25 DIAGNOSIS — D50.9 IRON DEFICIENCY ANEMIA, UNSPECIFIED IRON DEFICIENCY ANEMIA TYPE: Primary | ICD-10-CM

## 2021-04-25 PROBLEM — F41.1 GAD (GENERALIZED ANXIETY DISORDER): Status: ACTIVE | Noted: 2021-04-25

## 2021-04-25 PROBLEM — K29.00 ACUTE GASTRITIS: Status: ACTIVE | Noted: 2021-04-25

## 2021-04-25 PROBLEM — Z01.89 ENCOUNTER FOR LABORATORY TEST: Status: ACTIVE | Noted: 2021-04-25

## 2021-04-25 PROBLEM — E78.2 MIXED HYPERLIPIDEMIA: Status: ACTIVE | Noted: 2021-04-25

## 2021-04-25 PROBLEM — E78.5 DYSLIPIDEMIA: Status: ACTIVE | Noted: 2021-04-25

## 2021-04-29 ENCOUNTER — PATIENT MESSAGE (OUTPATIENT)
Dept: RESEARCH | Facility: HOSPITAL | Age: 46
End: 2021-04-29

## 2021-06-04 ENCOUNTER — LAB VISIT (OUTPATIENT)
Dept: LAB | Facility: HOSPITAL | Age: 46
End: 2021-06-04
Attending: INTERNAL MEDICINE
Payer: COMMERCIAL

## 2021-06-04 DIAGNOSIS — D50.9 IRON DEFICIENCY ANEMIA, UNSPECIFIED IRON DEFICIENCY ANEMIA TYPE: ICD-10-CM

## 2021-06-04 DIAGNOSIS — K29.00 ACUTE GASTRITIS, PRESENCE OF BLEEDING UNSPECIFIED, UNSPECIFIED GASTRITIS TYPE: ICD-10-CM

## 2021-06-04 DIAGNOSIS — F41.1 GAD (GENERALIZED ANXIETY DISORDER): ICD-10-CM

## 2021-06-04 DIAGNOSIS — E03.9 HYPOTHYROIDISM, UNSPECIFIED TYPE: ICD-10-CM

## 2021-06-04 DIAGNOSIS — E78.2 MIXED HYPERLIPIDEMIA: ICD-10-CM

## 2021-06-04 LAB
BASOPHILS # BLD AUTO: 0.03 K/UL (ref 0–0.2)
BASOPHILS NFR BLD: 0.5 % (ref 0–1.9)
DIFFERENTIAL METHOD: ABNORMAL
EOSINOPHIL # BLD AUTO: 0.1 K/UL (ref 0–0.5)
EOSINOPHIL NFR BLD: 1.8 % (ref 0–8)
ERYTHROCYTE [DISTWIDTH] IN BLOOD BY AUTOMATED COUNT: 14.8 % (ref 11.5–14.5)
HCT VFR BLD AUTO: 40.7 % (ref 37–48.5)
HGB BLD-MCNC: 13.2 G/DL (ref 12–16)
IMM GRANULOCYTES # BLD AUTO: 0.01 K/UL (ref 0–0.04)
IMM GRANULOCYTES NFR BLD AUTO: 0.2 % (ref 0–0.5)
LYMPHOCYTES # BLD AUTO: 0.8 K/UL (ref 1–4.8)
LYMPHOCYTES NFR BLD: 13.8 % (ref 18–48)
MCH RBC QN AUTO: 29.3 PG (ref 27–31)
MCHC RBC AUTO-ENTMCNC: 32.4 G/DL (ref 32–36)
MCV RBC AUTO: 90 FL (ref 82–98)
MONOCYTES # BLD AUTO: 0.5 K/UL (ref 0.3–1)
MONOCYTES NFR BLD: 9.7 % (ref 4–15)
NEUTROPHILS # BLD AUTO: 4.1 K/UL (ref 1.8–7.7)
NEUTROPHILS NFR BLD: 74 % (ref 38–73)
NRBC BLD-RTO: 0 /100 WBC
PLATELET # BLD AUTO: 272 K/UL (ref 150–450)
PMV BLD AUTO: 10.5 FL (ref 9.2–12.9)
RBC # BLD AUTO: 4.5 M/UL (ref 4–5.4)
WBC # BLD AUTO: 5.57 K/UL (ref 3.9–12.7)

## 2021-06-04 PROCEDURE — 84439 ASSAY OF FREE THYROXINE: CPT | Performed by: INTERNAL MEDICINE

## 2021-06-04 PROCEDURE — 82728 ASSAY OF FERRITIN: CPT | Performed by: INTERNAL MEDICINE

## 2021-06-04 PROCEDURE — 36415 COLL VENOUS BLD VENIPUNCTURE: CPT | Mod: PN | Performed by: INTERNAL MEDICINE

## 2021-06-04 PROCEDURE — 85025 COMPLETE CBC W/AUTO DIFF WBC: CPT | Performed by: INTERNAL MEDICINE

## 2021-06-04 PROCEDURE — 84443 ASSAY THYROID STIM HORMONE: CPT | Performed by: INTERNAL MEDICINE

## 2021-06-04 PROCEDURE — 80061 LIPID PANEL: CPT | Performed by: INTERNAL MEDICINE

## 2021-06-04 PROCEDURE — 83540 ASSAY OF IRON: CPT | Performed by: INTERNAL MEDICINE

## 2021-06-04 PROCEDURE — 84481 FREE ASSAY (FT-3): CPT | Performed by: INTERNAL MEDICINE

## 2021-06-05 LAB
CHOLEST SERPL-MCNC: 212 MG/DL (ref 120–199)
CHOLEST/HDLC SERPL: 4.3 {RATIO} (ref 2–5)
FERRITIN SERPL-MCNC: 25 NG/ML (ref 20–300)
HDLC SERPL-MCNC: 49 MG/DL (ref 40–75)
HDLC SERPL: 23.1 % (ref 20–50)
IRON SERPL-MCNC: 29 UG/DL (ref 30–160)
LDLC SERPL CALC-MCNC: 131.4 MG/DL (ref 63–159)
NONHDLC SERPL-MCNC: 163 MG/DL
SATURATED IRON: 8 % (ref 20–50)
T3FREE SERPL-MCNC: 2.4 PG/ML (ref 2.3–4.2)
T4 FREE SERPL-MCNC: 1.14 NG/DL (ref 0.71–1.51)
TOTAL IRON BINDING CAPACITY: 348 UG/DL (ref 250–450)
TRANSFERRIN SERPL-MCNC: 235 MG/DL (ref 200–375)
TRIGL SERPL-MCNC: 158 MG/DL (ref 30–150)
TSH SERPL DL<=0.005 MIU/L-ACNC: 0.96 UIU/ML (ref 0.4–4)

## 2021-06-15 ENCOUNTER — OFFICE VISIT (OUTPATIENT)
Dept: FAMILY MEDICINE | Facility: CLINIC | Age: 46
End: 2021-06-15
Payer: COMMERCIAL

## 2021-06-15 VITALS
BODY MASS INDEX: 22.85 KG/M2 | WEIGHT: 142.19 LBS | OXYGEN SATURATION: 98 % | DIASTOLIC BLOOD PRESSURE: 78 MMHG | SYSTOLIC BLOOD PRESSURE: 114 MMHG | HEIGHT: 66 IN | HEART RATE: 88 BPM

## 2021-06-15 DIAGNOSIS — J02.9 ACUTE PHARYNGITIS, UNSPECIFIED ETIOLOGY: ICD-10-CM

## 2021-06-15 DIAGNOSIS — J31.0 RHINITIS, UNSPECIFIED TYPE: ICD-10-CM

## 2021-06-15 DIAGNOSIS — Z01.89 ENCOUNTER FOR LABORATORY TEST: ICD-10-CM

## 2021-06-15 DIAGNOSIS — E78.2 MIXED HYPERLIPIDEMIA: Primary | ICD-10-CM

## 2021-06-15 DIAGNOSIS — E78.5 DYSLIPIDEMIA: ICD-10-CM

## 2021-06-15 DIAGNOSIS — E03.9 HYPOTHYROIDISM, UNSPECIFIED TYPE: ICD-10-CM

## 2021-06-15 DIAGNOSIS — D50.9 IRON DEFICIENCY ANEMIA, UNSPECIFIED IRON DEFICIENCY ANEMIA TYPE: ICD-10-CM

## 2021-06-15 DIAGNOSIS — E55.9 VITAMIN D DEFICIENCY DISEASE: ICD-10-CM

## 2021-06-15 DIAGNOSIS — F41.1 GAD (GENERALIZED ANXIETY DISORDER): ICD-10-CM

## 2021-06-15 DIAGNOSIS — H60.90 OTITIS EXTERNA, UNSPECIFIED CHRONICITY, UNSPECIFIED LATERALITY, UNSPECIFIED TYPE: ICD-10-CM

## 2021-06-15 PROCEDURE — 99214 OFFICE O/P EST MOD 30 MIN: CPT | Mod: S$GLB,,, | Performed by: INTERNAL MEDICINE

## 2021-06-15 PROCEDURE — 99999 PR PBB SHADOW E&M-EST. PATIENT-LVL III: CPT | Mod: PBBFAC,,, | Performed by: INTERNAL MEDICINE

## 2021-06-15 PROCEDURE — 3008F BODY MASS INDEX DOCD: CPT | Mod: CPTII,S$GLB,, | Performed by: INTERNAL MEDICINE

## 2021-06-15 PROCEDURE — 99214 PR OFFICE/OUTPT VISIT, EST, LEVL IV, 30-39 MIN: ICD-10-PCS | Mod: S$GLB,,, | Performed by: INTERNAL MEDICINE

## 2021-06-15 PROCEDURE — 87651 POCT STREP A MOLECULAR: ICD-10-PCS | Mod: QW,S$GLB,, | Performed by: INTERNAL MEDICINE

## 2021-06-15 PROCEDURE — 87651 STREP A DNA AMP PROBE: CPT | Mod: QW,S$GLB,, | Performed by: INTERNAL MEDICINE

## 2021-06-15 PROCEDURE — 87081 CULTURE SCREEN ONLY: CPT | Performed by: INTERNAL MEDICINE

## 2021-06-15 PROCEDURE — 3008F PR BODY MASS INDEX (BMI) DOCUMENTED: ICD-10-PCS | Mod: CPTII,S$GLB,, | Performed by: INTERNAL MEDICINE

## 2021-06-15 PROCEDURE — 99999 PR PBB SHADOW E&M-EST. PATIENT-LVL III: ICD-10-PCS | Mod: PBBFAC,,, | Performed by: INTERNAL MEDICINE

## 2021-06-15 RX ORDER — FLUTICASONE PROPIONATE 50 MCG
SPRAY, SUSPENSION (ML) NASAL
Qty: 16 G | Refills: 1 | Status: SHIPPED | OUTPATIENT
Start: 2021-06-15 | End: 2023-06-16

## 2021-06-15 RX ORDER — NEOMYCIN SULFATE, POLYMYXIN B SULFATE AND HYDROCORTISONE 10; 3.5; 1 MG/ML; MG/ML; [USP'U]/ML
4 SUSPENSION/ DROPS AURICULAR (OTIC) 3 TIMES DAILY
Qty: 6 ML | Refills: 0 | Status: SHIPPED | OUTPATIENT
Start: 2021-06-15 | End: 2021-06-22

## 2021-06-15 RX ORDER — LORATADINE 10 MG/1
TABLET ORAL
Qty: 30 TABLET | Refills: 1 | Status: SHIPPED | OUTPATIENT
Start: 2021-06-15 | End: 2023-06-16

## 2021-06-17 ENCOUNTER — TELEPHONE (OUTPATIENT)
Dept: FAMILY MEDICINE | Facility: CLINIC | Age: 46
End: 2021-06-17

## 2021-06-17 ENCOUNTER — LAB VISIT (OUTPATIENT)
Dept: LAB | Facility: HOSPITAL | Age: 46
End: 2021-06-17
Payer: COMMERCIAL

## 2021-06-17 ENCOUNTER — CLINICAL SUPPORT (OUTPATIENT)
Dept: FAMILY MEDICINE | Facility: CLINIC | Age: 46
End: 2021-06-17
Payer: COMMERCIAL

## 2021-06-17 DIAGNOSIS — D50.9 IRON DEFICIENCY ANEMIA, UNSPECIFIED IRON DEFICIENCY ANEMIA TYPE: Primary | ICD-10-CM

## 2021-06-17 DIAGNOSIS — D50.9 IRON DEFICIENCY ANEMIA, UNSPECIFIED IRON DEFICIENCY ANEMIA TYPE: ICD-10-CM

## 2021-06-17 LAB
CTP QC/QA: YES
MOLECULAR STREP A: NEGATIVE

## 2021-06-17 PROCEDURE — 82274 ASSAY TEST FOR BLOOD FECAL: CPT | Performed by: INTERNAL MEDICINE

## 2021-06-18 LAB — BACTERIA THROAT CULT: NORMAL

## 2021-06-22 LAB — HEMOCCULT STL QL IA: NEGATIVE

## 2021-07-07 ENCOUNTER — PATIENT MESSAGE (OUTPATIENT)
Dept: ADMINISTRATIVE | Facility: HOSPITAL | Age: 46
End: 2021-07-07

## 2022-05-30 ENCOUNTER — PATIENT MESSAGE (OUTPATIENT)
Dept: ADMINISTRATIVE | Facility: HOSPITAL | Age: 47
End: 2022-05-30
Payer: COMMERCIAL

## 2022-06-02 DIAGNOSIS — Z12.31 OTHER SCREENING MAMMOGRAM: ICD-10-CM

## 2022-06-23 ENCOUNTER — TELEPHONE (OUTPATIENT)
Dept: FAMILY MEDICINE | Facility: CLINIC | Age: 47
End: 2022-06-23
Payer: COMMERCIAL

## 2022-06-23 NOTE — TELEPHONE ENCOUNTER
----- Message from Homer Foster sent at 6/23/2022  8:38 AM CDT -----  Type:  Sooner Apoointment Request    Caller is requesting a sooner appointment.  Caller declined first available appointment listed below.  Caller will not accept being placed on the waitlist and is requesting a message be sent to doctor.  Name of Caller:Tiffany Hurst     When is the first available appointment? 6/29/22    Symptoms:coughing and sinus issues       Would the patient rather a call back or a response via [facility]? Call back     Best Call Back Number:[phone] (mobile)     Additional Information:Pt wants to know if possible she could be seen on today ?

## 2022-06-24 ENCOUNTER — OFFICE VISIT (OUTPATIENT)
Dept: FAMILY MEDICINE | Facility: CLINIC | Age: 47
End: 2022-06-24
Payer: COMMERCIAL

## 2022-06-24 DIAGNOSIS — J01.90 ACUTE NON-RECURRENT SINUSITIS, UNSPECIFIED LOCATION: Primary | ICD-10-CM

## 2022-06-24 PROCEDURE — 99214 PR OFFICE/OUTPT VISIT, EST, LEVL IV, 30-39 MIN: ICD-10-PCS | Mod: 95,,, | Performed by: PHYSICIAN ASSISTANT

## 2022-06-24 PROCEDURE — 99214 OFFICE O/P EST MOD 30 MIN: CPT | Mod: 95,,, | Performed by: PHYSICIAN ASSISTANT

## 2022-06-24 RX ORDER — PREDNISONE 20 MG/1
40 TABLET ORAL DAILY
Qty: 6 TABLET | Refills: 0 | Status: SHIPPED | OUTPATIENT
Start: 2022-06-24 | End: 2022-06-27

## 2022-06-24 RX ORDER — BENZONATATE 200 MG/1
200 CAPSULE ORAL 3 TIMES DAILY PRN
Qty: 30 CAPSULE | Refills: 0 | Status: SHIPPED | OUTPATIENT
Start: 2022-06-24 | End: 2022-07-04

## 2022-06-24 RX ORDER — AMOXICILLIN AND CLAVULANATE POTASSIUM 875; 125 MG/1; MG/1
1 TABLET, FILM COATED ORAL EVERY 12 HOURS
Qty: 20 TABLET | Refills: 0 | Status: SHIPPED | OUTPATIENT
Start: 2022-06-24 | End: 2022-07-04

## 2022-06-24 NOTE — PROGRESS NOTES
Subjective:       Patient ID: Tiffany Hurst is a 47 y.o. female   The patient location is: [place]  The chief complaint leading to consultation is:  cough    Visit type: audiovisual    Face to Face time with patient:  20 minutes  Twenty minutes of total time spent on the encounter, which includes face to face time and non-face to face time preparing to see the patient (eg, review of tests), Obtaining and/or reviewing separately obtained history, Documenting clinical information in the electronic or other health record, Independently interpreting results (not separately reported) and communicating results to the patient/family/caregiver, or Care coordination (not separately reported).         Each patient to whom he or she provides medical services by telemedicine is:  (1) informed of the relationship between the physician and patient and the respective role of any other health care provider with respect to management of the patient; and (2) notified that he or she may decline to receive medical services by telemedicine and may withdraw from such care at any time.    Notes:       Chief Complaint: No chief complaint on file.    HPI  Patient's  PCP: Dante Carnes MD.  The patient is new to me  Patient's past medical history includes:  MAURY, HLD    She presents today Co cough and congestion that began a couple weeks ago.  She has been taking Flonase and mucinex throughout the day with some temporary relief.  She has started to have headaches, no fever.     Review of Systems   Constitutional: Negative for chills and fever.   HENT: Positive for postnasal drip and sore throat. Negative for ear pain and rhinorrhea.    Respiratory: Positive for cough. Negative for shortness of breath and wheezing.    Cardiovascular: Negative for chest pain.   Musculoskeletal: Negative for myalgias.   Skin: Negative for rash.   Allergic/Immunologic: Negative for environmental allergies.   Neurological: Negative for headaches.        Objective:    Physical Exam  Constitutional:       General: She is not in acute distress.     Appearance: She is well-developed. She is not diaphoretic.   HENT:      Head: Normocephalic and atraumatic.   Eyes:      Pupils: Pupils are equal, round, and reactive to light.   Cardiovascular:      Rate and Rhythm: Normal rate and regular rhythm.      Heart sounds: Normal heart sounds. No murmur heard.    No friction rub. No gallop.   Pulmonary:      Effort: Pulmonary effort is normal. No respiratory distress.      Breath sounds: Normal breath sounds. No wheezing or rales.   Chest:      Chest wall: No tenderness.   Abdominal:      General: Bowel sounds are normal. There is no distension.      Palpations: Abdomen is soft. There is no mass.      Tenderness: There is no abdominal tenderness. There is no guarding.   Musculoskeletal:         General: Normal range of motion.      Cervical back: Normal range of motion and neck supple.   Skin:     General: Skin is warm and dry.      Findings: No rash.   Neurological:      Mental Status: She is alert and oriented to person, place, and time.   Psychiatric:         Behavior: Behavior normal.         Thought Content: Thought content normal.         Judgment: Judgment normal.          Assessment:       1. Acute non-recurrent sinusitis, unspecified location  predniSONE (DELTASONE) 20 MG tablet    amoxicillin-clavulanate 875-125mg (AUGMENTIN) 875-125 mg per tablet    benzonatate (TESSALON) 200 MG capsule        Plan:       Acute non-recurrent sinusitis, unspecified location  -     predniSONE (DELTASONE) 20 MG tablet; Take 2 tablets (40 mg total) by mouth once daily. for 3 days  Dispense: 6 tablet; Refill: 0  -     amoxicillin-clavulanate 875-125mg (AUGMENTIN) 875-125 mg per tablet; Take 1 tablet by mouth every 12 (twelve) hours. for 10 days  Dispense: 20 tablet; Refill: 0  -     benzonatate (TESSALON) 200 MG capsule; Take 1 capsule (200 mg total) by mouth 3 (three) times daily as needed for Cough.   Dispense: 30 capsule; Refill: 0         Follow up if symptoms worsen or fail to improve.       Spring Fernandez PA-C   06/24/2022   8:19 AM

## 2022-07-13 ENCOUNTER — PATIENT MESSAGE (OUTPATIENT)
Dept: ADMINISTRATIVE | Facility: HOSPITAL | Age: 47
End: 2022-07-13
Payer: COMMERCIAL

## 2022-07-13 ENCOUNTER — PATIENT OUTREACH (OUTPATIENT)
Dept: ADMINISTRATIVE | Facility: HOSPITAL | Age: 47
End: 2022-07-13
Payer: COMMERCIAL

## 2022-07-13 NOTE — PROGRESS NOTES
Non-compliant report chart audits for COLON CANCER SCREENING.     Care Everywhere and media, updates requested and reviewed.      Outreach to patient in reference to colon cancer screening.    RE:  Patient needs colon cancer screening    LMOR to return call to clinic    Portal message sent    Outreach:  Colon cancer screening

## 2022-07-27 DIAGNOSIS — Z12.11 COLON CANCER SCREENING: ICD-10-CM

## 2022-08-01 ENCOUNTER — PATIENT MESSAGE (OUTPATIENT)
Dept: ADMINISTRATIVE | Facility: HOSPITAL | Age: 47
End: 2022-08-01
Payer: COMMERCIAL

## 2022-08-24 ENCOUNTER — PATIENT MESSAGE (OUTPATIENT)
Dept: ADMINISTRATIVE | Facility: HOSPITAL | Age: 47
End: 2022-08-24
Payer: COMMERCIAL

## 2022-10-10 ENCOUNTER — PATIENT MESSAGE (OUTPATIENT)
Dept: ADMINISTRATIVE | Facility: HOSPITAL | Age: 47
End: 2022-10-10
Payer: COMMERCIAL

## 2022-12-27 PROBLEM — M75.111 PARTIAL NONTRAUMATIC TEAR OF RIGHT ROTATOR CUFF: Status: ACTIVE | Noted: 2022-12-27

## 2022-12-27 PROBLEM — M75.41 IMPINGEMENT SYNDROME OF RIGHT SHOULDER: Status: ACTIVE | Noted: 2022-12-27

## 2023-01-17 ENCOUNTER — PATIENT MESSAGE (OUTPATIENT)
Dept: ADMINISTRATIVE | Facility: HOSPITAL | Age: 48
End: 2023-01-17
Payer: COMMERCIAL

## 2023-04-11 ENCOUNTER — PATIENT MESSAGE (OUTPATIENT)
Dept: ADMINISTRATIVE | Facility: HOSPITAL | Age: 48
End: 2023-04-11
Payer: COMMERCIAL

## 2023-06-16 ENCOUNTER — OFFICE VISIT (OUTPATIENT)
Dept: URGENT CARE | Facility: CLINIC | Age: 48
End: 2023-06-16
Payer: COMMERCIAL

## 2023-06-16 VITALS
HEIGHT: 66 IN | OXYGEN SATURATION: 98 % | DIASTOLIC BLOOD PRESSURE: 65 MMHG | BODY MASS INDEX: 22.82 KG/M2 | SYSTOLIC BLOOD PRESSURE: 115 MMHG | WEIGHT: 142 LBS | HEART RATE: 84 BPM | TEMPERATURE: 98 F | RESPIRATION RATE: 19 BRPM

## 2023-06-16 DIAGNOSIS — J40 BRONCHITIS: Primary | ICD-10-CM

## 2023-06-16 PROCEDURE — 99213 OFFICE O/P EST LOW 20 MIN: CPT | Mod: S$GLB,,, | Performed by: PHYSICIAN ASSISTANT

## 2023-06-16 PROCEDURE — 99213 PR OFFICE/OUTPT VISIT, EST, LEVL III, 20-29 MIN: ICD-10-PCS | Mod: S$GLB,,, | Performed by: PHYSICIAN ASSISTANT

## 2023-06-16 RX ORDER — BENZONATATE 100 MG/1
200 CAPSULE ORAL 3 TIMES DAILY PRN
Qty: 60 CAPSULE | Refills: 0 | Status: SHIPPED | OUTPATIENT
Start: 2023-06-16

## 2023-06-16 RX ORDER — PREDNISONE 10 MG/1
10 TABLET ORAL DAILY
Qty: 3 TABLET | Refills: 0 | Status: SHIPPED | OUTPATIENT
Start: 2023-06-16 | End: 2023-06-19

## 2023-06-16 RX ORDER — GLYCOPYRRONIUM 2.4 G/100G
CLOTH TOPICAL
COMMUNITY
Start: 2023-03-20

## 2023-06-16 RX ORDER — AZITHROMYCIN 250 MG/1
TABLET, FILM COATED ORAL
Qty: 6 TABLET | Refills: 0 | Status: SHIPPED | OUTPATIENT
Start: 2023-06-16 | End: 2023-06-21

## 2023-06-16 RX ORDER — FLUOCINOLONE ACETONIDE 0.11 MG/ML
OIL TOPICAL
COMMUNITY
Start: 2023-03-20

## 2023-06-16 RX ORDER — PROMETHAZINE HYDROCHLORIDE AND DEXTROMETHORPHAN HYDROBROMIDE 6.25; 15 MG/5ML; MG/5ML
5 SYRUP ORAL NIGHTLY PRN
Qty: 50 ML | Refills: 0 | Status: SHIPPED | OUTPATIENT
Start: 2023-06-16 | End: 2023-06-26

## 2023-06-16 NOTE — PROGRESS NOTES
"Subjective:      Patient ID: Tiffany Hurst is a 47 y.o. female.    Vitals:  height is 5' 6" (1.676 m) and weight is 64.4 kg (142 lb). Her oral temperature is 98.4 °F (36.9 °C). Her blood pressure is 115/65 and her pulse is 84. Her respiration is 19 and oxygen saturation is 98%.     Chief Complaint: Cough    Pt is a 47 year old female who presents today with cough, Sinus Pressure, sore throat. Symptoms started about 1 week. No fever, chills, chest pain or shortness of breath. No nausea, vomiting or diarrhea. Cough is productive of green mucus. No known sick contacts.     Cough  This is a new problem. The current episode started 1 to 4 weeks ago. The problem has been gradually worsening. The cough is Productive of sputum. Associated symptoms include ear pain (no pain jet, just pressure), headaches (pressure), myalgias, postnasal drip and a sore throat. Pertinent negatives include no chest pain, chills, ear congestion, fever, heartburn, hemoptysis, nasal congestion, rash, rhinorrhea, shortness of breath, sweats, weight loss or wheezing. Nothing aggravates the symptoms. She has tried OTC cough suppressant and OTC inhaler for the symptoms. The treatment provided no relief. There is no history of asthma, bronchiectasis, bronchitis, COPD, emphysema, environmental allergies or pneumonia.     Constitution: Positive for fatigue. Negative for chills and fever.   HENT:  Positive for ear pain (no pain jet, just pressure), congestion, postnasal drip and sore throat. Negative for sinus pain, sinus pressure and trouble swallowing.    Neck: Negative for painful lymph nodes.   Cardiovascular:  Negative for chest pain.   Respiratory:  Positive for cough. Negative for sputum production, bloody sputum, shortness of breath and wheezing.    Gastrointestinal:  Negative for abdominal pain, nausea, vomiting, diarrhea and heartburn.   Musculoskeletal:  Positive for muscle ache.   Skin:  Negative for rash.   Allergic/Immunologic: Negative " for environmental allergies.   Neurological:  Positive for headaches (pressure).   Hematologic/Lymphatic: Negative for swollen lymph nodes.    Objective:     Physical Exam   Constitutional: She is oriented to person, place, and time. She appears well-developed. She is cooperative.  Non-toxic appearance. She does not appear ill. No distress.   HENT:   Head: Normocephalic and atraumatic.   Ears:   Right Ear: Hearing, tympanic membrane, external ear and ear canal normal.   Left Ear: Hearing, tympanic membrane, external ear and ear canal normal.   Nose: Congestion present. No mucosal edema, rhinorrhea or nasal deformity. No epistaxis. Right sinus exhibits no maxillary sinus tenderness and no frontal sinus tenderness. Left sinus exhibits no maxillary sinus tenderness and no frontal sinus tenderness.   Mouth/Throat: Uvula is midline and mucous membranes are normal. No trismus in the jaw. Normal dentition. No uvula swelling. Posterior oropharyngeal erythema present. No oropharyngeal exudate or posterior oropharyngeal edema.   Eyes: Conjunctivae and lids are normal. No scleral icterus.   Neck: Trachea normal and phonation normal. Neck supple. No edema present. No erythema present. No neck rigidity present.   Cardiovascular: Normal rate, regular rhythm, normal heart sounds and normal pulses.   No murmur heard.  Pulmonary/Chest: Effort normal and breath sounds normal. No respiratory distress. She has no decreased breath sounds. She has no rhonchi.   Abdominal: Normal appearance.   Musculoskeletal: Normal range of motion.         General: No deformity. Normal range of motion.   Neurological: She is alert and oriented to person, place, and time. She exhibits normal muscle tone. Coordination normal.   Skin: Skin is warm, dry, intact, not diaphoretic and not pale.   Psychiatric: Her speech is normal and behavior is normal. Judgment and thought content normal.   Nursing note and vitals reviewed.      Assessment:     1. Bronchitis         Plan:       Bronchitis  -     azithromycin (Z-CRISTAL) 250 MG tablet; Take 2 tablets by mouth on day 1; Take 1 tablet by mouth on days 2-5  Dispense: 6 tablet; Refill: 0  -     predniSONE (DELTASONE) 10 MG tablet; Take 1 tablet (10 mg total) by mouth once daily. for 3 days  Dispense: 3 tablet; Refill: 0  -     benzonatate (TESSALON PERLES) 100 MG capsule; Take 2 capsules (200 mg total) by mouth 3 (three) times daily as needed for Cough.  Dispense: 60 capsule; Refill: 0  -     promethazine-dextromethorphan (PROMETHAZINE-DM) 6.25-15 mg/5 mL Syrp; Take 5 mLs by mouth nightly as needed (cough).  Dispense: 50 mL; Refill: 0      Discussed results with patient and proper quarantine based on CDC guidelines.   Discussed use of OTC medications for symptom control as this is a viral disease.   All ER precautions covered including but not limited to shortness of breath, intractable fever, or chest pain.  Discussed RTC if symptoms worsen, change, or persist.     Patient verbalized understanding and agreed with the plan.     Tammie Valadez PA-C    Patient Instructions   PLEASE READ YOUR DISCHARGE INSTRUCTIONS ENTIRELY AS IT CONTAINS IMPORTANT INFORMATION.      Please drink plenty of fluids.    Please get plenty of rest.    Please return here or go to the Emergency Department for any concerns or worsening of condition.    Please take an over the counter antihistamine medication (allegra/Claritin/Zyrtec) of your choice as directed.    Try an over the counter decongestant like Mucinex D or Sudafed. You buy this behind the pharmacy counter    If you do have Hypertension or palpitations, it is safe to take Coricidin HBP for relief of sinus symptoms.    If not allergic, please take over the counter Tylenol (Acetaminophen) and/or Motrin (Ibuprofen) as directed for control of pain and/or fever.  Please follow up with your primary care doctor or specialist as needed.    Sore throat recommendations: Warm fluids, warm salt water gargles,  throat lozenges, tea, honey, soup, rest, hydration.    Use over the counter flonase: one spray each nostril twice daily OR two sprays each nostril once daily.     Sinus rinses DO NOT USE TAP WATER, if you must, water must be a rolling boil for 1 minute, let it cool, then use.  May use distilled water, or over the counter nasal saline rinses.  Vics vapor rub in shower to help open nasal passages.  May use nasal gel to keep passages moisturized.  May use Nasal saline sprays during the day for added relief of congestion.   For those who go to the gym, please do not use the sauna or steam room now to clear sinuses.    If you  smoke, please stop smoking.      Please return or see your primary care doctor if you develop new or worsening symptoms.     Please arrange follow up with your primary medical clinic as soon as possible. You must understand that you've received an Urgent Care treatment only and that you may be released before all of your medical problems are known or treated. You, the patient, will arrange for follow up as instructed. If your symptoms worsen or fail to improve you should go to the Emergency Room.

## 2023-07-14 ENCOUNTER — IMMUNIZATION (OUTPATIENT)
Dept: URGENT CARE | Facility: CLINIC | Age: 48
End: 2023-07-14
Payer: COMMERCIAL

## 2023-07-14 PROCEDURE — 90471 TD VACCINE GREATER THAN OR EQUAL TO 7YO WITH PRESERVATIVE IM: ICD-10-PCS | Mod: S$GLB,,, | Performed by: FAMILY MEDICINE

## 2023-07-14 PROCEDURE — 90714 TD VACC NO PRESV 7 YRS+ IM: CPT | Mod: S$GLB,,, | Performed by: FAMILY MEDICINE

## 2023-07-14 PROCEDURE — 90471 IMMUNIZATION ADMIN: CPT | Mod: S$GLB,,, | Performed by: FAMILY MEDICINE

## 2023-07-14 PROCEDURE — 90714 TD VACCINE GREATER THAN OR EQUAL TO 7YO WITH PRESERVATIVE IM: ICD-10-PCS | Mod: S$GLB,,, | Performed by: FAMILY MEDICINE

## 2023-10-04 ENCOUNTER — PATIENT MESSAGE (OUTPATIENT)
Dept: ADMINISTRATIVE | Facility: HOSPITAL | Age: 48
End: 2023-10-04
Payer: COMMERCIAL

## 2025-04-03 ENCOUNTER — TELEPHONE (OUTPATIENT)
Dept: FAMILY MEDICINE | Facility: CLINIC | Age: 50
End: 2025-04-03
Payer: COMMERCIAL

## 2025-04-03 NOTE — TELEPHONE ENCOUNTER
----- Message from Priscila sent at 4/3/2025 12:46 PM CDT -----  Contact: Self  Type:  Same Day Appointment RequestCaller is requesting a same day appointment.  Caller declined first available appointment listed below.  Name of Caller:  PatientWhen is the first available appointment?  N/ASymptoms:  Needs to re-establish care, was Deyvi pt, recent labs show highly anemicBest Call Back Number:  [phone] Information:   Pt is wanting to see if she can get in to see Dr Nieves tomorrow morning to discuss labs, can we please check with Dr Nieves and call pt back to assist. Thank You

## 2025-04-09 ENCOUNTER — LAB VISIT (OUTPATIENT)
Dept: LAB | Facility: HOSPITAL | Age: 50
End: 2025-04-09
Payer: COMMERCIAL

## 2025-04-09 ENCOUNTER — OFFICE VISIT (OUTPATIENT)
Dept: FAMILY MEDICINE | Facility: CLINIC | Age: 50
End: 2025-04-09
Payer: COMMERCIAL

## 2025-04-09 VITALS
DIASTOLIC BLOOD PRESSURE: 62 MMHG | BODY MASS INDEX: 23.72 KG/M2 | HEART RATE: 66 BPM | HEIGHT: 66 IN | SYSTOLIC BLOOD PRESSURE: 106 MMHG | WEIGHT: 147.63 LBS

## 2025-04-09 DIAGNOSIS — D50.0 IRON DEFICIENCY ANEMIA DUE TO CHRONIC BLOOD LOSS: Chronic | ICD-10-CM

## 2025-04-09 DIAGNOSIS — Z13.1 ENCOUNTER FOR SCREENING FOR DIABETES MELLITUS: ICD-10-CM

## 2025-04-09 DIAGNOSIS — Z12.31 OTHER SCREENING MAMMOGRAM: ICD-10-CM

## 2025-04-09 DIAGNOSIS — Z00.00 PREVENTATIVE HEALTH CARE: Primary | ICD-10-CM

## 2025-04-09 DIAGNOSIS — Z00.00 PREVENTATIVE HEALTH CARE: ICD-10-CM

## 2025-04-09 DIAGNOSIS — Z13.220 ENCOUNTER FOR SCREENING FOR LIPID DISORDER: ICD-10-CM

## 2025-04-09 DIAGNOSIS — L40.9 PSORIASIS: Chronic | ICD-10-CM

## 2025-04-09 DIAGNOSIS — N92.0 MENORRHAGIA WITH REGULAR CYCLE: ICD-10-CM

## 2025-04-09 PROBLEM — F41.1 GAD (GENERALIZED ANXIETY DISORDER): Status: RESOLVED | Noted: 2021-04-25 | Resolved: 2025-04-09

## 2025-04-09 PROBLEM — E78.2 MIXED HYPERLIPIDEMIA: Status: RESOLVED | Noted: 2021-04-25 | Resolved: 2025-04-09

## 2025-04-09 PROBLEM — D50.9 IRON DEFICIENCY ANEMIA: Chronic | Status: ACTIVE | Noted: 2021-04-25

## 2025-04-09 PROBLEM — E78.5 DYSLIPIDEMIA: Status: RESOLVED | Noted: 2021-04-25 | Resolved: 2025-04-09

## 2025-04-09 PROBLEM — K29.00 ACUTE GASTRITIS: Status: RESOLVED | Noted: 2021-04-25 | Resolved: 2025-04-09

## 2025-04-09 PROBLEM — M75.41 IMPINGEMENT SYNDROME OF RIGHT SHOULDER: Status: RESOLVED | Noted: 2022-12-27 | Resolved: 2025-04-09

## 2025-04-09 PROBLEM — M75.111 PARTIAL NONTRAUMATIC TEAR OF RIGHT ROTATOR CUFF: Status: RESOLVED | Noted: 2022-12-27 | Resolved: 2025-04-09

## 2025-04-09 PROBLEM — Z01.89 ENCOUNTER FOR LABORATORY TEST: Status: RESOLVED | Noted: 2021-04-25 | Resolved: 2025-04-09

## 2025-04-09 LAB
ALBUMIN SERPL BCP-MCNC: 4 G/DL (ref 3.5–5.2)
ALP SERPL-CCNC: 55 UNIT/L (ref 40–150)
ALT SERPL W/O P-5'-P-CCNC: 17 UNIT/L (ref 10–44)
ANION GAP (OHS): 8 MMOL/L (ref 8–16)
AST SERPL-CCNC: 16 UNIT/L (ref 11–45)
BILIRUB SERPL-MCNC: 0.3 MG/DL (ref 0.1–1)
BUN SERPL-MCNC: 11 MG/DL (ref 6–20)
CALCIUM SERPL-MCNC: 9 MG/DL (ref 8.7–10.5)
CHLORIDE SERPL-SCNC: 109 MMOL/L (ref 95–110)
CHOLEST SERPL-MCNC: 205 MG/DL (ref 120–199)
CHOLEST/HDLC SERPL: 4.4 {RATIO} (ref 2–5)
CO2 SERPL-SCNC: 22 MMOL/L (ref 23–29)
CREAT SERPL-MCNC: 0.8 MG/DL (ref 0.5–1.4)
EAG (OHS): 97 MG/DL (ref 68–131)
GFR SERPLBLD CREATININE-BSD FMLA CKD-EPI: >60 ML/MIN/1.73/M2
GLUCOSE SERPL-MCNC: 89 MG/DL (ref 70–110)
HBA1C MFR BLD: 5 % (ref 4–5.6)
HDLC SERPL-MCNC: 47 MG/DL (ref 40–75)
HDLC SERPL: 22.9 % (ref 20–50)
LDLC SERPL CALC-MCNC: 128.2 MG/DL (ref 63–159)
NONHDLC SERPL-MCNC: 158 MG/DL
POTASSIUM SERPL-SCNC: 4.1 MMOL/L (ref 3.5–5.1)
PROT SERPL-MCNC: 7.1 GM/DL (ref 6–8.4)
SODIUM SERPL-SCNC: 139 MMOL/L (ref 136–145)
TRIGL SERPL-MCNC: 149 MG/DL (ref 30–150)

## 2025-04-09 PROCEDURE — 82040 ASSAY OF SERUM ALBUMIN: CPT

## 2025-04-09 PROCEDURE — 99999 PR PBB SHADOW E&M-EST. PATIENT-LVL III: CPT | Mod: PBBFAC,,, | Performed by: STUDENT IN AN ORGANIZED HEALTH CARE EDUCATION/TRAINING PROGRAM

## 2025-04-09 PROCEDURE — 36415 COLL VENOUS BLD VENIPUNCTURE: CPT | Mod: PN

## 2025-04-09 PROCEDURE — 80061 LIPID PANEL: CPT

## 2025-04-09 PROCEDURE — 83036 HEMOGLOBIN GLYCOSYLATED A1C: CPT

## 2025-04-09 NOTE — PROGRESS NOTES
"Plan:     Tiffany Paul" was seen today for establish care.    Diagnoses and all orders for this visit:    Preventative health care: Declines colon cancer screening and mammogram at this time, as she prefers to first focus on LISA and menorrhagia.  -     Hemoglobin A1C; Future  -     Lipid Panel; Future  -     Comprehensive Metabolic Panel; Future    Encounter for screening for diabetes mellitus  -     Hemoglobin A1C; Future  -     Comprehensive Metabolic Panel; Future    Encounter for screening for lipid disorder  -     Lipid Panel; Future    Iron deficiency anemia due to chronic blood loss  -     Ambulatory referral/consult to Hematology / Oncology; Future    Psoriasis: Declines referral to Derm at this time.    Menorrhagia with regular cycle: Most recently following w/ Dr. Brooklynn Vega w/ OBGYN - planning to schedule return appointment. Wanting to hold off on pelvic US and pharmacologic treatment options until then.       Follow up in about 4 weeks (around 5/7/2025), or if symptoms worsen or fail to improve.    Lizzie Ramires MD  04/09/2025    Subjective:      Patient ID: Tiffany Hurst is a 49 y.o. female    Chief Complaint   Patient presents with    Establish Care     Prev PCP carmen ALEXIS  49 y.o. female with a PMHx as documented below presents to clinic today for the following:    Concern for iron deficiency due to significant fatigue over the past few weeks. Associated symptoms include significant hair thinning over the past 8 months. Pt reports trying oral iron but not being able to tolerate 2/2 significant GI distress.    Heavy periods for the past 2 years - changing super maxi pads every 2 hours with golf-ball size clots. No cramping but 'sick' feeling in the days before and after. Not currently following w/ gynecology.     Worsening psoriasis, spreading to different areas, over the past 8 months. She reports Tx w/ variety of steroid creams.    Following w/ Endocrinology for management of " hypothyroidism.    Outside labs performed 25 below (LMP 3/18/25):  H/H 9.8/33.8, MCV 78  Serum iron 19, saturated iron 5%  Ferritin 9        ROS  Constitutional:  Negative for chills and fever.   Respiratory:  Negative for shortness of breath.    Cardiovascular:  Negative for chest pain.   Gastrointestinal:  Negative for abdominal pain, constipation, diarrhea, nausea and vomiting.     Current Outpatient Medications   Medication Instructions    fluocinolone (DERMA-SMOOTHE) 0.01 % external oil No dose, route, or frequency recorded.    SYNTHROID 50 mcg tablet TAKE ONE TABLET BY MOUTH ONCE DAILY      Past Medical History:   Diagnosis Date    Dyslipidemia 2021    MAURY (generalized anxiety disorder) 2021    Hypothyroidism     Impingement syndrome of right shoulder 2022    Mixed hyperlipidemia 2021    Partial nontraumatic tear of right rotator cuff 2022    Psoriasis     Vitamin D deficiency      Past Surgical History:   Procedure Laterality Date    ARTHROSCOPIC REPAIR OF ROTATOR CUFF OF SHOULDER Right 2022    Procedure: REPAIR, ROTATOR CUFF, ARTHROSCOPIC;  Surgeon: Erick Lopez MD;  Location: [facility] OR;  Service: Orthopedics;  Laterality: Right;    ARTHROSCOPY OF SHOULDER WITH DECOMPRESSION OF SUBACROMIAL SPACE Right 2022    Procedure: ARTHROSCOPY, SHOULDER, WITH SUBACROMIAL SPACE DECOMPRESSION;  Surgeon: Erick Lopez MD;  Location: [facility] OR;  Service: Orthopedics;  Laterality: Right;     SECTION      x 1    FOOT SURGERY      right     Review of patient's allergies indicates:  No Known Allergies    Family History   Problem Relation Name Age of Onset    Thyroid disease Mother      Skin cancer Mother      Hypertension Father Asher Mandujano     Diabetes Father Asher Mandujano     Stroke Father Asher Mandujano     Esophageal cancer Father Asher Mcguiremery     Cancer Father Asher Mcguiremery     Skin cancer Sister      Skin cancer Sister       Social  "History[1]    [facility]   Most Recent Immunizations   Administered Date(s) Administered    Influenza - Trivalent - Afluria, Fluzone MDV 10/24/2007    Td (ADULT) 07/14/2023    Tdap 10/24/2007     Objective:      Vitals:    04/09/25 0806   BP: 106/62   BP Location: Right arm   Pulse: 66   Weight: 67 kg (147 lb 9.6 oz)   Height: 5' 5.5" (1.664 m)     Body mass index is 24.19 kg/m².    Physical Exam   Constitutional:       General: No acute distress.  HENT:      Head: Normocephalic and atraumatic.   Pulmonary:      Effort: Pulmonary effort is normal. No respiratory distress.   Neurological:      General: No focal deficit present.      Mental Status: Alert and oriented to person, place, and time. Mental status is at baseline.    Assessment:       1. Preventative health care    2. Encounter for screening for diabetes mellitus    3. Encounter for screening for lipid disorder    4. Iron deficiency anemia due to chronic blood loss    5. Psoriasis    6. Menorrhagia with regular cycle        Lizzie Ramires MD  [facility]  Office: [phone]   Fax: [phone]  04/09/2025      Disclaimer: This note was partly generated using dictation software which may occasionally result in transcription errors.    Total time spent on this encounter includes face to face time and non-face to face time preparing to see the patient (eg, review of tests), obtaining and/or reviewing separately obtained history, documenting clinical information in the electronic or other health record, independently interpreting results, and communicating results to the patient/family/caregiver, or care coordinator.      Visit today included increased complexity associated with the care of the episodic problem (see above) addressed and managing the longitudinal care of the patient due to the serious and/or complex managed problem(s) (see above).          [1]   Social History  Tobacco Use    Smoking " status: Never    Smokeless tobacco: Never   Substance Use Topics    Alcohol use: No    Drug use: No

## 2025-04-10 ENCOUNTER — RESULTS FOLLOW-UP (OUTPATIENT)
Dept: FAMILY MEDICINE | Facility: CLINIC | Age: 50
End: 2025-04-10

## 2025-04-11 ENCOUNTER — TELEPHONE (OUTPATIENT)
Dept: HEMATOLOGY/ONCOLOGY | Facility: CLINIC | Age: 50
End: 2025-04-11
Payer: COMMERCIAL

## 2025-04-21 ENCOUNTER — TELEPHONE (OUTPATIENT)
Dept: HEMATOLOGY/ONCOLOGY | Facility: CLINIC | Age: 50
End: 2025-04-21
Payer: COMMERCIAL

## 2025-05-07 ENCOUNTER — TELEPHONE (OUTPATIENT)
Facility: CLINIC | Age: 50
End: 2025-05-07
Payer: COMMERCIAL

## 2025-05-07 ENCOUNTER — TELEPHONE (OUTPATIENT)
Dept: HEMATOLOGY/ONCOLOGY | Facility: CLINIC | Age: 50
End: 2025-05-07
Payer: COMMERCIAL

## 2025-05-07 DIAGNOSIS — D50.9 IRON DEFICIENCY ANEMIA, UNSPECIFIED IRON DEFICIENCY ANEMIA TYPE: Primary | Chronic | ICD-10-CM

## 2025-05-07 NOTE — TELEPHONE ENCOUNTER
----- Message from Tamara sent at 5/6/2025  1:52 PM CDT -----  The patient called about a referral that was put in for her to see hematolgy/oncology. Please check on this and call her back. She said she would like to see Dr. Reynolds. # [phone]

## 2025-05-07 NOTE — NURSING
Oncology Navigation   Intake  Date of Diagnosis: 05/01/25  Cancer Type: Benign hem  Type of Referral: Internal  Date of Referral: 05/07/25  Initial Nurse Navigator Contact: 05/07/25  Referral to Initial Contact Timeline (days): 0  Appointment Date: 06/05/25  Reason if booked > 7 days after scheduling: Specific provider / access; Patient request       Scheduled patient with Dr. Gerber per patient request. Diagnosis iron deficiency anemia. Labs are scanned into media date of 4/1/25. Patient also inquired about her daughter being seen and transferring care from Dr. Brar. Informed that new referral can be placed and reviewed. Contact information given. Encouraged to call with any questions or concerns.

## 2025-05-07 NOTE — TELEPHONE ENCOUNTER
----- Message from Tamara sent at 5/6/2025  1:52 PM CDT -----  The patient called about a referral that was put in for her to see hematolgy/oncology. Please check on this and call her back. She said she would like to see Dr. Reynolds. # [phone]

## 2025-05-07 NOTE — TELEPHONE ENCOUNTER
Returned call to patient to schedule appt and left detail voicemail with telephone number to call back.

## 2025-05-29 ENCOUNTER — TELEPHONE (OUTPATIENT)
Facility: CLINIC | Age: 50
End: 2025-05-29
Payer: COMMERCIAL

## 2025-06-05 ENCOUNTER — OFFICE VISIT (OUTPATIENT)
Facility: CLINIC | Age: 50
End: 2025-06-05
Payer: COMMERCIAL

## 2025-06-05 VITALS
WEIGHT: 142.13 LBS | OXYGEN SATURATION: 100 % | HEART RATE: 70 BPM | HEIGHT: 66 IN | BODY MASS INDEX: 22.84 KG/M2 | RESPIRATION RATE: 18 BRPM | TEMPERATURE: 98 F

## 2025-06-05 DIAGNOSIS — E03.9 ACQUIRED HYPOTHYROIDISM: Chronic | ICD-10-CM

## 2025-06-05 DIAGNOSIS — D50.9 IRON DEFICIENCY ANEMIA, UNSPECIFIED IRON DEFICIENCY ANEMIA TYPE: Primary | Chronic | ICD-10-CM

## 2025-06-05 DIAGNOSIS — N92.0 MENORRHAGIA WITH REGULAR CYCLE: Chronic | ICD-10-CM

## 2025-06-05 PROCEDURE — G2211 COMPLEX E/M VISIT ADD ON: HCPCS | Mod: S$GLB,,, | Performed by: INTERNAL MEDICINE

## 2025-06-05 PROCEDURE — 3044F HG A1C LEVEL LT 7.0%: CPT | Mod: CPTII,S$GLB,, | Performed by: INTERNAL MEDICINE

## 2025-06-05 PROCEDURE — 99999 PR PBB SHADOW E&M-EST. PATIENT-LVL IV: CPT | Mod: PBBFAC,,, | Performed by: INTERNAL MEDICINE

## 2025-06-05 PROCEDURE — 99205 OFFICE O/P NEW HI 60 MIN: CPT | Mod: S$GLB,,, | Performed by: INTERNAL MEDICINE

## 2025-06-05 PROCEDURE — 3008F BODY MASS INDEX DOCD: CPT | Mod: CPTII,S$GLB,, | Performed by: INTERNAL MEDICINE

## 2025-06-05 PROCEDURE — 1159F MED LIST DOCD IN RCRD: CPT | Mod: CPTII,S$GLB,, | Performed by: INTERNAL MEDICINE

## 2025-06-05 NOTE — PROGRESS NOTES
[facility] [address] Hematology Oncology In Office   Initial Encounter Note    25    Subjective:      Patient ID:   Tiffany Hurst  49 y.o. female  1975  Lizzie Ramires MD      Chief Complaint:   Anemia      HPI:  49 y.o. female for evaluation of anemia.  Hemoglobin is 9 point 5 and MCV is 78, white blood cell count is 7600 and platelet count is 398,000. Her ferritin level is 9 and B12 is 414.  Energy level is at an 7/10.  She was on oral iron but had problems with constipation and abdominal cramps.    She has history of long COVID, anxiety, hypothyroidism and hair loss.  She admits to fatigue symptoms and shortness of breath with exertion, she does not crave ice but she does crave Olives.    She is a  4 para 4 miscarriage 0 individual.  She had onset of menses at age 14 and she delivered her 1st live child at the maternal age of 30.  She had  section x1.  She is premenopausal and has heavy menses.  She has not had breast biopsy and there is no family history of mother sister or daughters with breast cancer.    She is status post right heel foot surgery.  Right shoulder arthroscopy, and  section x1.    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    Her mother had hypothyroidism, her father had diabetes hypertension esophageal cancer.  Two brothers are alive and well, 1 2 sisters are alive and well, a daughter has increased heart rate with shortness of breath, another daughter has tiffanie hypertrophy and 2 sons are alive and well.    ROS:   GEN: normal without any fever, night sweats or weight loss  HEENT:See HPI  CV: normal with no CP, SOB, PND, TRAN or orthopnea  PULM: normal with no SOB, cough, hemoptysis, sputum or pleuritic pain  GI: normal with no abdominal pain, nausea, vomiting, constipation, diarrhea, melanotic stools, BRBPR, or hematemesis  : normal with no hematuria, dysuria  BREAST: normal  "with no mass, discharge, pain  SKIN: normal with no rash, erythema, bruising, or swelling         Objective:   Vitals:  Blood pressure (!) (P) 120/56, pulse 70, temperature 98.2 °F (36.8 °C), temperature source Temporal, resp. rate 18, height 5' 5.5" (1.664 m), weight 64.5 kg (142 lb 1.6 oz), SpO2 100%.    Physical Examination:   GEN: no apparent distress, comfortable  HEAD: atraumatic and normocephalic  EYES: no pallor, no icterus  ENT: no pharyngeal erythema, external ears WNL; no nasal discharge  NECK: no masses, thyroid normal, trachea midline, no LAD/LN's, supple  CV: RRR with no murmur; normal pulse;  no pedal edema  CHEST: Normal respiratory effort; CTAB; normal breath sounds; no wheeze or crackles  ABDOM: nontender and nondistended; soft; no rebound/guarding, L/S NP  MUSC/Skeletal: ROM normal; no crepitus; joints normal  EXTREM: no clubbing, cyanosis, inflammation or swelling  SKIN: no rashes, lesions, ulcers, petechiae   : no cvat  NEURO: grossly intact; motor/sensory WNL; no tremors  PSYCH: normal mood, affect and behavior  LYMPH: normal cervical, supraclavicular, axillary  LN's      Labs:   Lab Results   Component Value Date    WBC 5.72 12/21/2022    HGB 11.7 (L) 12/21/2022    HCT 35.5 (L) 12/21/2022    MCV 90 12/21/2022     12/21/2022    CMP  Sodium   Date Value Ref Range Status   04/09/2025 139 136 - 145 mmol/L Final   12/21/2022 138 136 - 145 mmol/L Final     Potassium   Date Value Ref Range Status   04/09/2025 4.1 3.5 - 5.1 mmol/L Final   12/21/2022 4.4 3.5 - 5.1 mmol/L Final     Chloride   Date Value Ref Range Status   04/09/2025 109 95 - 110 mmol/L Final   12/21/2022 105 95 - 110 mmol/L Final     CO2   Date Value Ref Range Status   04/09/2025 22 (L) 23 - 29 mmol/L Final   12/21/2022 27 22 - 31 mmol/L Final     Glucose   Date Value Ref Range Status   04/09/2025 89 70 - 110 mg/dL Final   12/21/2022 82 70 - 110 mg/dL Final     Comment:     The ADA recommends the following guidelines for " fasting glucose:    Normal:       less than 100 mg/dL    Prediabetes:  100 mg/dL to 125 mg/dL    Diabetes:     126 mg/dL or higher       BUN   Date Value Ref Range Status   04/09/2025 11 6 - 20 mg/dL Final     Creatinine   Date Value Ref Range Status   04/09/2025 0.8 0.5 - 1.4 mg/dL Final     Calcium   Date Value Ref Range Status   04/09/2025 9.0 8.7 - 10.5 mg/dL Final   12/21/2022 9.0 8.4 - 10.2 mg/dL Final     Protein Total   Date Value Ref Range Status   04/09/2025 7.1 6.0 - 8.4 gm/dL Final     Total Protein   Date Value Ref Range Status   12/21/2022 6.7 6.0 - 8.4 g/dL Final     Albumin   Date Value Ref Range Status   04/09/2025 4.0 3.5 - 5.2 g/dL Final   12/21/2022 4.2 3.5 - 5.2 g/dL Final     Total Bilirubin   Date Value Ref Range Status   12/21/2022 0.3 0.2 - 1.3 mg/dL Final     Bilirubin Total   Date Value Ref Range Status   04/09/2025 0.3 0.1 - 1.0 mg/dL Final     Comment:     For infants and newborns, interpretation of results should be based   on gestational age, weight and in agreement with clinical   observations.    Premature Infant recommended reference ranges:   0-24 hours:  <8.0 mg/dL   24-48 hours: <12.0 mg/dL   3-5 days:    <15.0 mg/dL   6-29 days:   <15.0 mg/dL     Alkaline Phosphatase   Date Value Ref Range Status   12/21/2022 45 38 - 145 U/L Final     ALP   Date Value Ref Range Status   04/09/2025 55 40 - 150 unit/L Final     AST   Date Value Ref Range Status   04/09/2025 16 11 - 45 unit/L Final   12/21/2022 25 14 - 36 U/L Final     ALT   Date Value Ref Range Status   04/09/2025 17 10 - 44 unit/L Final   12/21/2022 19 0 - 35 U/L Final     Anion Gap   Date Value Ref Range Status   04/09/2025 8 8 - 16 mmol/L Final     eGFR if    Date Value Ref Range Status   03/15/2021 >60.0 >60 mL/min/1.73 m^2 Final     eGFR if non    Date Value Ref Range Status   03/15/2021 >60.0 >60 mL/min/1.73 m^2 Final     Comment:     Calculation used to obtain the estimated glomerular  filtration  rate (eGFR) is the CKD-EPI equation.            Assessment:   (1) 49 y.o. female with moderate iron deficiency anemia probably secondary to heavy menses.  Stools for occult blood will be done.    (2) have discussed with her how the kidney stimulate the bone marrow to make a proximally 1 pt of blood per week and the spleen removed is a proximally 1 pt of blood per week.  If imbalance 1 keeps 12-16 pt of blood in the circulation as reflected per the hemoglobin.  She has a hemoglobin of 9.8 and is out of balance.    Production of red blood cells in the bone marrow is reduced from the iron deficiency with a ferritin of 9.  Other lab studies that may be involved in the anemia process include folic acid deficiency, B12 is normal at 414.    (3) she is poorly tolerant of oral iron because of cramps and constipation symptoms.  Discussed with her administration of Ferrlecit or Venofer by weekly infusion to gradually replenish ferritin stores and facilitate red blood cell production here.    Will check stools for occult blood.    (4) I expect she will come to require EGD and colonoscopy to complete her evaluation.        RTC 4-6 weeks.    Toy Reynolds MD  [facility]  6/5/25

## 2025-06-13 ENCOUNTER — TELEPHONE (OUTPATIENT)
Facility: CLINIC | Age: 50
End: 2025-06-13
Payer: COMMERCIAL

## 2025-06-13 NOTE — TELEPHONE ENCOUNTER
Patient called in. This RN explained that it could take up to two (2) weeks to get authorization for her Iron Infusion. Patient verbally demonstrated understanding..

## 2025-06-15 RX ORDER — HEPARIN 100 UNIT/ML
500 SYRINGE INTRAVENOUS
OUTPATIENT
Start: 2025-06-24

## 2025-06-15 RX ORDER — SODIUM FERRIC GLUCONATE COMPLEX IN SUCROSE 12.5 MG/ML
125 INJECTION INTRAVENOUS
OUTPATIENT
Start: 2025-06-24

## 2025-06-15 RX ORDER — EPINEPHRINE 0.3 MG/.3ML
0.3 INJECTION SUBCUTANEOUS ONCE AS NEEDED
OUTPATIENT
Start: 2025-06-24

## 2025-06-15 RX ORDER — SODIUM CHLORIDE 0.9 % (FLUSH) 0.9 %
10 SYRINGE (ML) INJECTION
OUTPATIENT
Start: 2025-06-24

## 2025-06-24 ENCOUNTER — LAB VISIT (OUTPATIENT)
Dept: LAB | Facility: HOSPITAL | Age: 50
End: 2025-06-24
Attending: INTERNAL MEDICINE
Payer: COMMERCIAL

## 2025-06-24 ENCOUNTER — TELEPHONE (OUTPATIENT)
Facility: CLINIC | Age: 50
End: 2025-06-24
Payer: COMMERCIAL

## 2025-06-24 ENCOUNTER — INFUSION (OUTPATIENT)
Dept: INFUSION THERAPY | Facility: HOSPITAL | Age: 50
End: 2025-06-24
Attending: INTERNAL MEDICINE
Payer: COMMERCIAL

## 2025-06-24 VITALS
OXYGEN SATURATION: 100 % | HEART RATE: 66 BPM | HEIGHT: 66 IN | DIASTOLIC BLOOD PRESSURE: 68 MMHG | RESPIRATION RATE: 16 BRPM | SYSTOLIC BLOOD PRESSURE: 116 MMHG | TEMPERATURE: 98 F | BODY MASS INDEX: 23.92 KG/M2 | WEIGHT: 148.81 LBS

## 2025-06-24 DIAGNOSIS — D50.0 IRON DEFICIENCY ANEMIA DUE TO CHRONIC BLOOD LOSS: Primary | ICD-10-CM

## 2025-06-24 DIAGNOSIS — D50.9 IRON DEFICIENCY ANEMIA, UNSPECIFIED IRON DEFICIENCY ANEMIA TYPE: Chronic | ICD-10-CM

## 2025-06-24 DIAGNOSIS — D50.9 IRON DEFICIENCY ANEMIA, UNSPECIFIED IRON DEFICIENCY ANEMIA TYPE: Primary | ICD-10-CM

## 2025-06-24 LAB
OB PNL STL: NEGATIVE

## 2025-06-24 PROCEDURE — 25000003 PHARM REV CODE 250: Performed by: INTERNAL MEDICINE

## 2025-06-24 PROCEDURE — 82272 OCCULT BLD FECES 1-3 TESTS: CPT | Mod: 91

## 2025-06-24 PROCEDURE — A4216 STERILE WATER/SALINE, 10 ML: HCPCS | Performed by: INTERNAL MEDICINE

## 2025-06-24 PROCEDURE — 63600175 PHARM REV CODE 636 W HCPCS: Performed by: INTERNAL MEDICINE

## 2025-06-24 PROCEDURE — 96365 THER/PROPH/DIAG IV INF INIT: CPT

## 2025-06-24 RX ORDER — EPINEPHRINE 0.3 MG/.3ML
0.3 INJECTION SUBCUTANEOUS ONCE AS NEEDED
OUTPATIENT
Start: 2025-07-01

## 2025-06-24 RX ORDER — SODIUM FERRIC GLUCONATE COMPLEX IN SUCROSE 12.5 MG/ML
125 INJECTION INTRAVENOUS
OUTPATIENT
Start: 2025-07-01

## 2025-06-24 RX ORDER — SODIUM CHLORIDE 0.9 % (FLUSH) 0.9 %
10 SYRINGE (ML) INJECTION
Status: DISCONTINUED | OUTPATIENT
Start: 2025-06-24 | End: 2025-06-24 | Stop reason: HOSPADM

## 2025-06-24 RX ORDER — SODIUM CHLORIDE 0.9 % (FLUSH) 0.9 %
10 SYRINGE (ML) INJECTION
OUTPATIENT
Start: 2025-07-01

## 2025-06-24 RX ORDER — HEPARIN 100 UNIT/ML
500 SYRINGE INTRAVENOUS
OUTPATIENT
Start: 2025-07-01

## 2025-06-24 RX ORDER — SODIUM FERRIC GLUCONATE COMPLEX IN SUCROSE 12.5 MG/ML
125 INJECTION INTRAVENOUS
Status: DISCONTINUED | OUTPATIENT
Start: 2025-06-24 | End: 2025-06-24

## 2025-06-24 RX ADMIN — Medication 10 ML: at 10:06

## 2025-06-24 RX ADMIN — SODIUM CHLORIDE 125 MG: 9 INJECTION, SOLUTION INTRAVENOUS at 10:06

## 2025-06-24 RX ADMIN — Medication 10 ML: at 11:06

## 2025-06-24 NOTE — PLAN OF CARE
Problem: Adult Inpatient Plan of Care  Goal: Optimal Comfort and Wellbeing  Outcome: Progressing  Intervention: Provide Person-Centered Care  Flowsheets (Taken 6/24/2025 1040)  Trust Relationship/Rapport:   care explained   choices provided   emotional support provided   empathic listening provided   questions answered   questions encouraged   reassurance provided   thoughts/feelings acknowledged

## 2025-07-02 ENCOUNTER — INFUSION (OUTPATIENT)
Dept: INFUSION THERAPY | Facility: HOSPITAL | Age: 50
End: 2025-07-02
Attending: INTERNAL MEDICINE
Payer: COMMERCIAL

## 2025-07-02 VITALS
TEMPERATURE: 98 F | WEIGHT: 148.13 LBS | OXYGEN SATURATION: 100 % | DIASTOLIC BLOOD PRESSURE: 64 MMHG | HEIGHT: 66 IN | HEART RATE: 70 BPM | BODY MASS INDEX: 23.81 KG/M2 | RESPIRATION RATE: 16 BRPM | SYSTOLIC BLOOD PRESSURE: 113 MMHG

## 2025-07-02 DIAGNOSIS — D50.0 IRON DEFICIENCY ANEMIA DUE TO CHRONIC BLOOD LOSS: Primary | ICD-10-CM

## 2025-07-02 PROCEDURE — 63600175 PHARM REV CODE 636 W HCPCS: Performed by: INTERNAL MEDICINE

## 2025-07-02 PROCEDURE — 96365 THER/PROPH/DIAG IV INF INIT: CPT

## 2025-07-02 PROCEDURE — 25000003 PHARM REV CODE 250: Performed by: INTERNAL MEDICINE

## 2025-07-02 PROCEDURE — A4216 STERILE WATER/SALINE, 10 ML: HCPCS | Performed by: INTERNAL MEDICINE

## 2025-07-02 RX ORDER — HEPARIN 100 UNIT/ML
500 SYRINGE INTRAVENOUS
OUTPATIENT
Start: 2025-07-08

## 2025-07-02 RX ORDER — EPINEPHRINE 0.3 MG/.3ML
0.3 INJECTION SUBCUTANEOUS ONCE AS NEEDED
OUTPATIENT
Start: 2025-07-08

## 2025-07-02 RX ORDER — SODIUM CHLORIDE 0.9 % (FLUSH) 0.9 %
10 SYRINGE (ML) INJECTION
Status: DISCONTINUED | OUTPATIENT
Start: 2025-07-02 | End: 2025-07-02 | Stop reason: HOSPADM

## 2025-07-02 RX ORDER — SODIUM FERRIC GLUCONATE COMPLEX IN SUCROSE 12.5 MG/ML
125 INJECTION INTRAVENOUS
Status: DISCONTINUED | OUTPATIENT
Start: 2025-07-02 | End: 2025-07-02 | Stop reason: SDUPTHER

## 2025-07-02 RX ORDER — HEPARIN 100 UNIT/ML
500 SYRINGE INTRAVENOUS
Status: DISCONTINUED | OUTPATIENT
Start: 2025-07-02 | End: 2025-07-02 | Stop reason: HOSPADM

## 2025-07-02 RX ORDER — SODIUM CHLORIDE 0.9 % (FLUSH) 0.9 %
10 SYRINGE (ML) INJECTION
OUTPATIENT
Start: 2025-07-08

## 2025-07-02 RX ORDER — SODIUM FERRIC GLUCONATE COMPLEX IN SUCROSE 12.5 MG/ML
125 INJECTION INTRAVENOUS
OUTPATIENT
Start: 2025-07-08

## 2025-07-02 RX ADMIN — SODIUM CHLORIDE 125 MG: 9 INJECTION, SOLUTION INTRAVENOUS at 03:07

## 2025-07-02 RX ADMIN — Medication 10 ML: at 04:07

## 2025-07-02 RX ADMIN — Medication 10 ML: at 03:07

## 2025-07-02 NOTE — PLAN OF CARE
1505 Pt here for infusion tx, placed to chair, vss.  1618 Therapy plan administered. Tolerated well. D/c instructions given including s/s of reaction and to notify administering MD or go to ER. D/c'd to home with jered.

## 2025-07-02 NOTE — PLAN OF CARE
Problem: Adult Inpatient Plan of Care  Goal: Optimal Comfort and Wellbeing  Outcome: Progressing  Intervention: Monitor Pain and Promote Comfort  Flowsheets (Taken 7/2/2025 1527)  Pain Management Interventions:   relaxation techniques promoted   quiet environment facilitated  Intervention: Provide Person-Centered Care  Flowsheets (Taken 7/2/2025 1527)  Trust Relationship/Rapport:   care explained   reassurance provided   choices provided   thoughts/feelings acknowledged   emotional support provided   empathic listening provided   questions answered   questions encouraged

## 2025-07-15 ENCOUNTER — INFUSION (OUTPATIENT)
Dept: INFUSION THERAPY | Facility: HOSPITAL | Age: 50
End: 2025-07-15
Attending: INTERNAL MEDICINE
Payer: COMMERCIAL

## 2025-07-15 VITALS
BODY MASS INDEX: 23.78 KG/M2 | HEIGHT: 66 IN | OXYGEN SATURATION: 99 % | RESPIRATION RATE: 18 BRPM | HEART RATE: 67 BPM | WEIGHT: 148 LBS | TEMPERATURE: 98 F | SYSTOLIC BLOOD PRESSURE: 124 MMHG | DIASTOLIC BLOOD PRESSURE: 67 MMHG

## 2025-07-15 DIAGNOSIS — D50.0 IRON DEFICIENCY ANEMIA DUE TO CHRONIC BLOOD LOSS: Primary | ICD-10-CM

## 2025-07-15 PROCEDURE — A4216 STERILE WATER/SALINE, 10 ML: HCPCS | Performed by: INTERNAL MEDICINE

## 2025-07-15 PROCEDURE — 63600175 PHARM REV CODE 636 W HCPCS: Performed by: INTERNAL MEDICINE

## 2025-07-15 PROCEDURE — 96365 THER/PROPH/DIAG IV INF INIT: CPT

## 2025-07-15 PROCEDURE — 25000003 PHARM REV CODE 250: Performed by: INTERNAL MEDICINE

## 2025-07-15 RX ORDER — SODIUM FERRIC GLUCONATE COMPLEX IN SUCROSE 12.5 MG/ML
125 INJECTION INTRAVENOUS
Status: DISCONTINUED | OUTPATIENT
Start: 2025-07-15 | End: 2025-07-15 | Stop reason: SDUPTHER

## 2025-07-15 RX ORDER — HEPARIN 100 UNIT/ML
500 SYRINGE INTRAVENOUS
Status: DISCONTINUED | OUTPATIENT
Start: 2025-07-15 | End: 2025-07-15 | Stop reason: HOSPADM

## 2025-07-15 RX ORDER — EPINEPHRINE 0.3 MG/.3ML
0.3 INJECTION SUBCUTANEOUS ONCE AS NEEDED
OUTPATIENT
Start: 2025-07-22

## 2025-07-15 RX ORDER — SODIUM CHLORIDE 0.9 % (FLUSH) 0.9 %
10 SYRINGE (ML) INJECTION
Status: DISCONTINUED | OUTPATIENT
Start: 2025-07-15 | End: 2025-07-15 | Stop reason: HOSPADM

## 2025-07-15 RX ORDER — SODIUM FERRIC GLUCONATE COMPLEX IN SUCROSE 12.5 MG/ML
125 INJECTION INTRAVENOUS
OUTPATIENT
Start: 2025-07-22

## 2025-07-15 RX ORDER — SODIUM CHLORIDE 0.9 % (FLUSH) 0.9 %
10 SYRINGE (ML) INJECTION
OUTPATIENT
Start: 2025-07-22

## 2025-07-15 RX ORDER — HEPARIN 100 UNIT/ML
500 SYRINGE INTRAVENOUS
OUTPATIENT
Start: 2025-07-22

## 2025-07-15 RX ADMIN — Medication 10 ML: at 11:07

## 2025-07-15 RX ADMIN — Medication 10 ML: at 10:07

## 2025-07-15 RX ADMIN — SODIUM CHLORIDE 125 MG: 9 INJECTION, SOLUTION INTRAVENOUS at 10:07

## 2025-07-15 NOTE — PLAN OF CARE
Problem: Adult Inpatient Plan of Care  Goal: Optimal Comfort and Wellbeing  Outcome: Progressing  Intervention: Provide Person-Centered Care  Flowsheets (Taken 7/15/2025 1033)  Trust Relationship/Rapport:   care explained   choices provided   emotional support provided   empathic listening provided   questions answered   questions encouraged   reassurance provided   thoughts/feelings acknowledged

## 2025-07-22 ENCOUNTER — INFUSION (OUTPATIENT)
Dept: INFUSION THERAPY | Facility: HOSPITAL | Age: 50
End: 2025-07-22
Attending: INTERNAL MEDICINE
Payer: COMMERCIAL

## 2025-07-22 VITALS
SYSTOLIC BLOOD PRESSURE: 128 MMHG | TEMPERATURE: 98 F | OXYGEN SATURATION: 99 % | DIASTOLIC BLOOD PRESSURE: 61 MMHG | RESPIRATION RATE: 16 BRPM | HEART RATE: 65 BPM

## 2025-07-22 DIAGNOSIS — D50.0 IRON DEFICIENCY ANEMIA DUE TO CHRONIC BLOOD LOSS: Primary | ICD-10-CM

## 2025-07-22 PROCEDURE — 25000003 PHARM REV CODE 250: Performed by: INTERNAL MEDICINE

## 2025-07-22 PROCEDURE — 63600175 PHARM REV CODE 636 W HCPCS: Performed by: INTERNAL MEDICINE

## 2025-07-22 PROCEDURE — 96365 THER/PROPH/DIAG IV INF INIT: CPT

## 2025-07-22 RX ORDER — SODIUM FERRIC GLUCONATE COMPLEX IN SUCROSE 12.5 MG/ML
125 INJECTION INTRAVENOUS
OUTPATIENT
Start: 2025-07-29

## 2025-07-22 RX ORDER — SODIUM CHLORIDE 0.9 % (FLUSH) 0.9 %
10 SYRINGE (ML) INJECTION
OUTPATIENT
Start: 2025-07-29

## 2025-07-22 RX ORDER — SODIUM FERRIC GLUCONATE COMPLEX IN SUCROSE 12.5 MG/ML
125 INJECTION INTRAVENOUS
Status: DISCONTINUED | OUTPATIENT
Start: 2025-07-22 | End: 2025-07-22

## 2025-07-22 RX ORDER — HEPARIN 100 UNIT/ML
500 SYRINGE INTRAVENOUS
OUTPATIENT
Start: 2025-07-29

## 2025-07-22 RX ORDER — HEPARIN 100 UNIT/ML
500 SYRINGE INTRAVENOUS
Status: DISCONTINUED | OUTPATIENT
Start: 2025-07-22 | End: 2025-07-22 | Stop reason: HOSPADM

## 2025-07-22 RX ORDER — SODIUM CHLORIDE 0.9 % (FLUSH) 0.9 %
10 SYRINGE (ML) INJECTION
Status: DISCONTINUED | OUTPATIENT
Start: 2025-07-22 | End: 2025-07-22 | Stop reason: HOSPADM

## 2025-07-22 RX ORDER — EPINEPHRINE 0.3 MG/.3ML
0.3 INJECTION SUBCUTANEOUS ONCE AS NEEDED
OUTPATIENT
Start: 2025-07-29

## 2025-07-22 RX ADMIN — SODIUM CHLORIDE 125 MG: 9 INJECTION, SOLUTION INTRAVENOUS at 10:07

## 2025-07-22 NOTE — PROGRESS NOTES
Patient placed in chair, VSS, tolerating infusion, no signs of distress at this time, DC instructions given and patient states understanding

## 2025-07-29 ENCOUNTER — INFUSION (OUTPATIENT)
Dept: INFUSION THERAPY | Facility: HOSPITAL | Age: 50
End: 2025-07-29
Attending: INTERNAL MEDICINE
Payer: COMMERCIAL

## 2025-07-29 VITALS
DIASTOLIC BLOOD PRESSURE: 67 MMHG | RESPIRATION RATE: 16 BRPM | OXYGEN SATURATION: 100 % | SYSTOLIC BLOOD PRESSURE: 122 MMHG | HEART RATE: 61 BPM | TEMPERATURE: 98 F

## 2025-07-29 DIAGNOSIS — D50.0 IRON DEFICIENCY ANEMIA DUE TO CHRONIC BLOOD LOSS: Primary | ICD-10-CM

## 2025-07-29 PROCEDURE — 25000003 PHARM REV CODE 250: Performed by: INTERNAL MEDICINE

## 2025-07-29 PROCEDURE — 63600175 PHARM REV CODE 636 W HCPCS: Performed by: INTERNAL MEDICINE

## 2025-07-29 PROCEDURE — 96365 THER/PROPH/DIAG IV INF INIT: CPT

## 2025-07-29 RX ORDER — EPINEPHRINE 0.3 MG/.3ML
0.3 INJECTION SUBCUTANEOUS ONCE AS NEEDED
OUTPATIENT
Start: 2025-08-05

## 2025-07-29 RX ORDER — SODIUM CHLORIDE 0.9 % (FLUSH) 0.9 %
10 SYRINGE (ML) INJECTION
OUTPATIENT
Start: 2025-08-05

## 2025-07-29 RX ORDER — HEPARIN 100 UNIT/ML
500 SYRINGE INTRAVENOUS
OUTPATIENT
Start: 2025-08-05

## 2025-07-29 RX ORDER — SODIUM FERRIC GLUCONATE COMPLEX IN SUCROSE 12.5 MG/ML
125 INJECTION INTRAVENOUS
OUTPATIENT
Start: 2025-08-05

## 2025-07-29 RX ORDER — SODIUM FERRIC GLUCONATE COMPLEX IN SUCROSE 12.5 MG/ML
125 INJECTION INTRAVENOUS
Status: DISCONTINUED | OUTPATIENT
Start: 2025-07-29 | End: 2025-07-29 | Stop reason: SDUPTHER

## 2025-07-29 RX ADMIN — SODIUM CHLORIDE 125 MG: 9 INJECTION, SOLUTION INTRAVENOUS at 10:07

## 2025-07-29 NOTE — PLAN OF CARE
Problem: Adult Inpatient Plan of Care  Goal: Optimal Comfort and Wellbeing  Outcome: Progressing  Intervention: Provide Person-Centered Care  Flowsheets (Taken 7/29/2025 1025)  Trust Relationship/Rapport:   care explained   choices provided   emotional support provided   empathic listening provided   questions answered   questions encouraged   reassurance provided   thoughts/feelings acknowledged

## 2025-08-05 ENCOUNTER — INFUSION (OUTPATIENT)
Dept: INFUSION THERAPY | Facility: HOSPITAL | Age: 50
End: 2025-08-05
Attending: INTERNAL MEDICINE
Payer: COMMERCIAL

## 2025-08-05 VITALS
OXYGEN SATURATION: 98 % | DIASTOLIC BLOOD PRESSURE: 61 MMHG | SYSTOLIC BLOOD PRESSURE: 114 MMHG | BODY MASS INDEX: 23.73 KG/M2 | WEIGHT: 147 LBS | TEMPERATURE: 98 F | HEART RATE: 63 BPM

## 2025-08-05 DIAGNOSIS — D50.0 IRON DEFICIENCY ANEMIA DUE TO CHRONIC BLOOD LOSS: Primary | ICD-10-CM

## 2025-08-05 PROCEDURE — 96365 THER/PROPH/DIAG IV INF INIT: CPT

## 2025-08-05 PROCEDURE — 63600175 PHARM REV CODE 636 W HCPCS: Performed by: INTERNAL MEDICINE

## 2025-08-05 PROCEDURE — 25000003 PHARM REV CODE 250: Performed by: INTERNAL MEDICINE

## 2025-08-05 RX ORDER — HEPARIN 100 UNIT/ML
500 SYRINGE INTRAVENOUS
Status: DISCONTINUED | OUTPATIENT
Start: 2025-08-05 | End: 2025-08-05 | Stop reason: HOSPADM

## 2025-08-05 RX ORDER — SODIUM CHLORIDE 0.9 % (FLUSH) 0.9 %
10 SYRINGE (ML) INJECTION
OUTPATIENT
Start: 2025-08-12

## 2025-08-05 RX ORDER — HEPARIN 100 UNIT/ML
500 SYRINGE INTRAVENOUS
OUTPATIENT
Start: 2025-08-12

## 2025-08-05 RX ORDER — SODIUM CHLORIDE 0.9 % (FLUSH) 0.9 %
10 SYRINGE (ML) INJECTION
Status: DISCONTINUED | OUTPATIENT
Start: 2025-08-05 | End: 2025-08-05 | Stop reason: HOSPADM

## 2025-08-05 RX ORDER — SODIUM FERRIC GLUCONATE COMPLEX IN SUCROSE 12.5 MG/ML
125 INJECTION INTRAVENOUS
Status: DISCONTINUED | OUTPATIENT
Start: 2025-08-05 | End: 2025-08-05 | Stop reason: SDUPTHER

## 2025-08-05 RX ORDER — EPINEPHRINE 0.3 MG/.3ML
0.3 INJECTION SUBCUTANEOUS ONCE AS NEEDED
OUTPATIENT
Start: 2025-08-12

## 2025-08-05 RX ORDER — SODIUM FERRIC GLUCONATE COMPLEX IN SUCROSE 12.5 MG/ML
125 INJECTION INTRAVENOUS
OUTPATIENT
Start: 2025-08-12

## 2025-08-05 RX ADMIN — SODIUM CHLORIDE 125 MG: 9 INJECTION, SOLUTION INTRAVENOUS at 10:08

## 2025-08-05 NOTE — PLAN OF CARE
Problem: Adult Inpatient Plan of Care  Goal: Plan of Care Review  8/5/2025 1012 by Adry Langford RN  Outcome: Met  8/5/2025 1012 by Adry Langford RN  Outcome: Progressing  Goal: Patient-Specific Goal (Individualized)  8/5/2025 1012 by Adry Langford RN  Outcome: Met  8/5/2025 1012 by Adry Langford RN  Outcome: Progressing  Goal: Absence of Hospital-Acquired Illness or Injury  8/5/2025 1012 by Adry Langford RN  Outcome: Met  8/5/2025 1012 by Adry Langford RN  Outcome: Progressing  Goal: Optimal Comfort and Wellbeing  8/5/2025 1012 by Adry Langford RN  Outcome: Met  8/5/2025 1012 by Adry Langford RN  Outcome: Progressing  Goal: Readiness for Transition of Care  8/5/2025 1012 by Adry Langford RN  Outcome: Met  8/5/2025 1012 by Adry Langford RN  Outcome: Progressing

## 2025-08-11 ENCOUNTER — INFUSION (OUTPATIENT)
Dept: INFUSION THERAPY | Facility: HOSPITAL | Age: 50
End: 2025-08-11
Attending: INTERNAL MEDICINE
Payer: COMMERCIAL

## 2025-08-11 VITALS
SYSTOLIC BLOOD PRESSURE: 121 MMHG | OXYGEN SATURATION: 99 % | TEMPERATURE: 98 F | DIASTOLIC BLOOD PRESSURE: 72 MMHG | HEIGHT: 66 IN | RESPIRATION RATE: 16 BRPM | BODY MASS INDEX: 24 KG/M2 | HEART RATE: 61 BPM | WEIGHT: 149.31 LBS

## 2025-08-11 DIAGNOSIS — D50.0 IRON DEFICIENCY ANEMIA DUE TO CHRONIC BLOOD LOSS: Primary | ICD-10-CM

## 2025-08-11 PROCEDURE — 63600175 PHARM REV CODE 636 W HCPCS: Performed by: INTERNAL MEDICINE

## 2025-08-11 PROCEDURE — A4216 STERILE WATER/SALINE, 10 ML: HCPCS | Performed by: INTERNAL MEDICINE

## 2025-08-11 PROCEDURE — 25000003 PHARM REV CODE 250: Performed by: INTERNAL MEDICINE

## 2025-08-11 PROCEDURE — 96365 THER/PROPH/DIAG IV INF INIT: CPT

## 2025-08-11 RX ORDER — EPINEPHRINE 0.3 MG/.3ML
0.3 INJECTION SUBCUTANEOUS ONCE AS NEEDED
OUTPATIENT
Start: 2025-08-12

## 2025-08-11 RX ORDER — SODIUM FERRIC GLUCONATE COMPLEX IN SUCROSE 12.5 MG/ML
125 INJECTION INTRAVENOUS
Status: DISCONTINUED | OUTPATIENT
Start: 2025-08-11 | End: 2025-08-11 | Stop reason: SDUPTHER

## 2025-08-11 RX ORDER — SODIUM CHLORIDE 0.9 % (FLUSH) 0.9 %
10 SYRINGE (ML) INJECTION
Status: DISCONTINUED | OUTPATIENT
Start: 2025-08-11 | End: 2025-08-11 | Stop reason: HOSPADM

## 2025-08-11 RX ORDER — HEPARIN 100 UNIT/ML
500 SYRINGE INTRAVENOUS
Status: DISCONTINUED | OUTPATIENT
Start: 2025-08-11 | End: 2025-08-11 | Stop reason: HOSPADM

## 2025-08-11 RX ORDER — SODIUM FERRIC GLUCONATE COMPLEX IN SUCROSE 12.5 MG/ML
125 INJECTION INTRAVENOUS
OUTPATIENT
Start: 2025-08-12

## 2025-08-11 RX ORDER — HEPARIN 100 UNIT/ML
500 SYRINGE INTRAVENOUS
OUTPATIENT
Start: 2025-08-12

## 2025-08-11 RX ORDER — SODIUM CHLORIDE 0.9 % (FLUSH) 0.9 %
10 SYRINGE (ML) INJECTION
OUTPATIENT
Start: 2025-08-12

## 2025-08-11 RX ORDER — EPINEPHRINE 0.3 MG/.3ML
0.3 INJECTION SUBCUTANEOUS ONCE AS NEEDED
Status: DISCONTINUED | OUTPATIENT
Start: 2025-08-11 | End: 2025-08-11 | Stop reason: HOSPADM

## 2025-08-11 RX ADMIN — SODIUM CHLORIDE 125 MG: 9 INJECTION, SOLUTION INTRAVENOUS at 10:08

## 2025-08-11 RX ADMIN — Medication 10 ML: at 11:08

## 2025-08-11 RX ADMIN — Medication 10 ML: at 10:08

## 2025-08-19 ENCOUNTER — INFUSION (OUTPATIENT)
Dept: INFUSION THERAPY | Facility: HOSPITAL | Age: 50
End: 2025-08-19
Attending: INTERNAL MEDICINE
Payer: COMMERCIAL

## 2025-08-19 VITALS
HEIGHT: 66 IN | SYSTOLIC BLOOD PRESSURE: 125 MMHG | HEART RATE: 69 BPM | TEMPERATURE: 98 F | DIASTOLIC BLOOD PRESSURE: 65 MMHG | WEIGHT: 149.31 LBS | RESPIRATION RATE: 15 BRPM | OXYGEN SATURATION: 98 % | BODY MASS INDEX: 24 KG/M2

## 2025-08-19 DIAGNOSIS — D50.0 IRON DEFICIENCY ANEMIA DUE TO CHRONIC BLOOD LOSS: Primary | ICD-10-CM

## 2025-08-19 PROCEDURE — 25000003 PHARM REV CODE 250: Performed by: INTERNAL MEDICINE

## 2025-08-19 PROCEDURE — 63600175 PHARM REV CODE 636 W HCPCS: Performed by: INTERNAL MEDICINE

## 2025-08-19 PROCEDURE — 96365 THER/PROPH/DIAG IV INF INIT: CPT

## 2025-08-19 PROCEDURE — A4216 STERILE WATER/SALINE, 10 ML: HCPCS | Performed by: INTERNAL MEDICINE

## 2025-08-19 RX ORDER — SODIUM FERRIC GLUCONATE COMPLEX IN SUCROSE 12.5 MG/ML
125 INJECTION INTRAVENOUS
Status: CANCELLED | OUTPATIENT
Start: 2025-08-19 | End: 2025-08-19

## 2025-08-19 RX ORDER — HEPARIN 100 UNIT/ML
500 SYRINGE INTRAVENOUS
Status: DISCONTINUED | OUTPATIENT
Start: 2025-08-19 | End: 2025-08-19

## 2025-08-19 RX ORDER — HEPARIN 100 UNIT/ML
500 SYRINGE INTRAVENOUS
Status: CANCELLED | OUTPATIENT
Start: 2025-08-19

## 2025-08-19 RX ORDER — SODIUM CHLORIDE 0.9 % (FLUSH) 0.9 %
10 SYRINGE (ML) INJECTION
Status: DISCONTINUED | OUTPATIENT
Start: 2025-08-19 | End: 2025-08-19

## 2025-08-19 RX ORDER — SODIUM FERRIC GLUCONATE COMPLEX IN SUCROSE 12.5 MG/ML
125 INJECTION INTRAVENOUS
Status: DISCONTINUED | OUTPATIENT
Start: 2025-08-19 | End: 2025-08-19 | Stop reason: SDUPTHER

## 2025-08-19 RX ORDER — SODIUM CHLORIDE 0.9 % (FLUSH) 0.9 %
10 SYRINGE (ML) INJECTION
Status: CANCELLED | OUTPATIENT
Start: 2025-08-19

## 2025-08-19 RX ORDER — EPINEPHRINE 0.3 MG/.3ML
0.3 INJECTION SUBCUTANEOUS ONCE AS NEEDED
Status: DISCONTINUED | OUTPATIENT
Start: 2025-08-19 | End: 2025-08-19

## 2025-08-19 RX ORDER — EPINEPHRINE 0.3 MG/.3ML
0.3 INJECTION SUBCUTANEOUS ONCE AS NEEDED
Status: CANCELLED | OUTPATIENT
Start: 2025-08-19 | End: 2037-01-15

## 2025-08-19 RX ADMIN — Medication 10 ML: at 11:08

## 2025-08-19 RX ADMIN — SODIUM CHLORIDE 125 MG: 9 INJECTION, SOLUTION INTRAVENOUS at 10:08

## 2025-08-19 RX ADMIN — Medication 10 ML: at 10:08

## 2025-09-05 ENCOUNTER — TELEPHONE (OUTPATIENT)
Facility: CLINIC | Age: 50
End: 2025-09-05
Payer: COMMERCIAL

## 2025-09-05 DIAGNOSIS — E03.9 ACQUIRED HYPOTHYROIDISM: ICD-10-CM

## 2025-09-05 DIAGNOSIS — D50.9 IRON DEFICIENCY ANEMIA, UNSPECIFIED IRON DEFICIENCY ANEMIA TYPE: Primary | ICD-10-CM
